# Patient Record
Sex: FEMALE | Race: BLACK OR AFRICAN AMERICAN | NOT HISPANIC OR LATINO
[De-identification: names, ages, dates, MRNs, and addresses within clinical notes are randomized per-mention and may not be internally consistent; named-entity substitution may affect disease eponyms.]

---

## 2018-10-02 ENCOUNTER — TRANSCRIPTION ENCOUNTER (OUTPATIENT)
Age: 68
End: 2018-10-02

## 2019-08-21 ENCOUNTER — NON-APPOINTMENT (OUTPATIENT)
Age: 69
End: 2019-08-21

## 2019-08-21 ENCOUNTER — APPOINTMENT (OUTPATIENT)
Dept: CARDIOLOGY | Facility: CLINIC | Age: 69
End: 2019-08-21
Payer: MEDICARE

## 2019-08-21 VITALS
BODY MASS INDEX: 37.56 KG/M2 | DIASTOLIC BLOOD PRESSURE: 72 MMHG | SYSTOLIC BLOOD PRESSURE: 128 MMHG | WEIGHT: 212 LBS | HEART RATE: 57 BPM | RESPIRATION RATE: 14 BRPM | HEIGHT: 63 IN

## 2019-08-21 DIAGNOSIS — Z00.00 ENCOUNTER FOR GENERAL ADULT MEDICAL EXAMINATION W/OUT ABNORMAL FINDINGS: ICD-10-CM

## 2019-08-21 PROCEDURE — 99214 OFFICE O/P EST MOD 30 MIN: CPT

## 2019-08-21 PROCEDURE — 93000 ELECTROCARDIOGRAM COMPLETE: CPT

## 2019-08-21 NOTE — ASSESSMENT
[FreeTextEntry1] : EKG demonstrates normal sinus rhythm with a rate of 57, slight left axis deviation, poor R-wave progression V1 to V3 with borderline nonspecific T-wave flattening in the lateral leads;\par \par ;in summary the patient is a 69-year-old woman with a history of mild to moderate valvular insufficiency and slight heart murmur with abnormal EKG who was been stable and asymptomatic from the cardiac standpoint\par \par Plan:\par \par Patient encouraged ; modest walking exercise regimen this summer\par \par ; Continue to maintain healthy nutritional weight loss plan\par \par Recommend echo by next visit-- within 5-6 months or p.r.n.;\par \par continued timely checkups and laboratory blood tests w/ PCP care;

## 2019-08-21 NOTE — PHYSICAL EXAM
[Normal Conjunctiva] : the conjunctiva exhibited no abnormalities [Eyelids - No Xanthelasma] : the eyelids demonstrated no xanthelasmas [Normal Oral Mucosa] : normal oral mucosa [No Oral Cyanosis] : no oral cyanosis [No Oral Pallor] : no oral pallor [Normal Jugular Venous A Waves Present] : normal jugular venous A waves present [Normal Jugular Venous V Waves Present] : normal jugular venous V waves present [No Jugular Venous Landis A Waves] : no jugular venous landis A waves [Respiration, Rhythm And Depth] : normal respiratory rhythm and effort [Exaggerated Use Of Accessory Muscles For Inspiration] : no accessory muscle use [Auscultation Breath Sounds / Voice Sounds] : lungs were clear to auscultation bilaterally [Abdomen Soft] : soft [Abdomen Tenderness] : non-tender [Abdomen Mass (___ Cm)] : no abdominal mass palpated [Abnormal Walk] : normal gait [Gait - Sufficient For Exercise Testing] : the gait was sufficient for exercise testing [Nail Clubbing] : no clubbing of the fingernails [Cyanosis, Localized] : no localized cyanosis [Petechial Hemorrhages (___cm)] : no petechial hemorrhages [Fingers Osler's Nodes] : Osler's nodes were not seenon the fingers [FreeTextEntry1] : Surgical scar on her right knee well-healed [Skin Color & Pigmentation] : normal skin color and pigmentation [] : no rash [No Venous Stasis] : no venous stasis [Skin Lesions] : no skin lesions [No Skin Ulcers] : no skin ulcer [No Xanthoma] : no  xanthoma was observed [Oriented To Time, Place, And Person] : oriented to person, place, and time [Affect] : the affect was normal [Mood] : the mood was normal [No Anxiety] : not feeling anxious

## 2019-08-21 NOTE — HISTORY OF PRESENT ILLNESS
[FreeTextEntry1] : She reports a recent checkup with her primary care including laboratory blood tests and states everything seemed "favorable";\par \par \par Past medical history also includes total right knee replacement surgery after injury several years ago;\par \par Patient reports she recently returned from a trip to [place], [place] where she was visiting with family;\par

## 2019-08-21 NOTE — REASON FOR VISIT
[Follow-Up - Clinic] : a clinic follow-up of [FreeTextEntry1] : The patient is a 69-year-old white female with a known history for borderline abnormal EKG, mild to moderate valvular insufficiency and prior history of bariatric surgery for obesity who presents back to the office today for general cardiac checkup.\par \par Patient reports she's been feeling well through the summer without any significant symptoms of chest pain, shortness of breath, palpitations dizziness or syncope\par \par ;;

## 2020-01-24 ENCOUNTER — APPOINTMENT (OUTPATIENT)
Dept: CARDIOLOGY | Facility: CLINIC | Age: 70
End: 2020-01-24
Payer: MEDICARE

## 2020-01-24 PROCEDURE — 93306 TTE W/DOPPLER COMPLETE: CPT

## 2020-02-04 ENCOUNTER — APPOINTMENT (OUTPATIENT)
Dept: CARDIOLOGY | Facility: CLINIC | Age: 70
End: 2020-02-04

## 2020-09-21 ENCOUNTER — TRANSCRIPTION ENCOUNTER (OUTPATIENT)
Age: 70
End: 2020-09-21

## 2020-11-29 ENCOUNTER — TRANSCRIPTION ENCOUNTER (OUTPATIENT)
Age: 70
End: 2020-11-29

## 2020-12-13 ENCOUNTER — TRANSCRIPTION ENCOUNTER (OUTPATIENT)
Age: 70
End: 2020-12-13

## 2021-08-26 ENCOUNTER — TRANSCRIPTION ENCOUNTER (OUTPATIENT)
Age: 71
End: 2021-08-26

## 2021-09-06 ENCOUNTER — TRANSCRIPTION ENCOUNTER (OUTPATIENT)
Age: 71
End: 2021-09-06

## 2021-10-29 ENCOUNTER — TRANSCRIPTION ENCOUNTER (OUTPATIENT)
Age: 71
End: 2021-10-29

## 2021-12-29 ENCOUNTER — NON-APPOINTMENT (OUTPATIENT)
Age: 71
End: 2021-12-29

## 2021-12-29 ENCOUNTER — APPOINTMENT (OUTPATIENT)
Dept: CARDIOLOGY | Facility: CLINIC | Age: 71
End: 2021-12-29
Payer: MEDICARE

## 2021-12-29 VITALS
BODY MASS INDEX: 38.98 KG/M2 | RESPIRATION RATE: 14 BRPM | HEART RATE: 51 BPM | WEIGHT: 220 LBS | HEIGHT: 63 IN | DIASTOLIC BLOOD PRESSURE: 69 MMHG | SYSTOLIC BLOOD PRESSURE: 129 MMHG

## 2021-12-29 PROCEDURE — 99214 OFFICE O/P EST MOD 30 MIN: CPT

## 2021-12-29 PROCEDURE — 93000 ELECTROCARDIOGRAM COMPLETE: CPT

## 2021-12-29 RX ORDER — LEVOTHYROXINE SODIUM 0.14 MG/1
137 TABLET ORAL DAILY
Refills: 0 | Status: ACTIVE | COMMUNITY

## 2021-12-29 NOTE — REASON FOR VISIT
[Symptom and Test Evaluation] : symptom and test evaluation [Arrhythmia/ECG Abnorrmalities] : arrhythmia/ECG abnormalities [Structural Heart and Valve Disease] : structural heart and valve disease [Other: ____] : [unfilled] [FreeTextEntry3] : AIDA Howell [FreeTextEntry1] : The patient is a 71-year-old  woman who has a history for mild to moderate valvular insufficiency and borderline abnormal EKG in the past with obesity; she has a prior history of bariatric surgery with significant weight loss;\par \par Patient presents back to the office today for general cardiac check up;\par \par She reports that she's been experiencing lately some fluttering-like feeling or palpitations in the chest especially when she lays down in the evening;\par \par There's been no chest pain, dizziness or syncope; she does admit to occasional shortness of breath when going up the stairs;

## 2021-12-29 NOTE — PHYSICAL EXAM
[Well Developed] : well developed [Well Nourished] : well nourished [No Acute Distress] : no acute distress [Obese] : obese [Normal Conjunctiva] : normal conjunctiva [Normal Venous Pressure] : normal venous pressure [No Carotid Bruit] : no carotid bruit [Normal S1, S2] : normal S1, S2 [No Rub] : no rub [No Gallop] : no gallop [Clear Lung Fields] : clear lung fields [Good Air Entry] : good air entry [No Respiratory Distress] : no respiratory distress  [Soft] : abdomen soft [Non Tender] : non-tender [No Masses/organomegaly] : no masses/organomegaly [Normal Bowel Sounds] : normal bowel sounds [Normal Gait] : normal gait [No Edema] : no edema [No Cyanosis] : no cyanosis [No Clubbing] : no clubbing [No Varicosities] : no varicosities [No Rash] : no rash [No Skin Lesions] : no skin lesions [Moves all extremities] : moves all extremities [No Focal Deficits] : no focal deficits [Normal Speech] : normal speech [Alert and Oriented] : alert and oriented [Normal memory] : normal memory [de-identified] : regular rhythm, grade 1-2/6 systolic murmur;

## 2021-12-29 NOTE — HISTORY OF PRESENT ILLNESS
[FreeTextEntry1] : Patient admits to being under some increased stress recently as her kids had moved back into the house for a time but expects that they will be leaving soon on their own;\par \par \par She drinks some caffeinated coffee daily and eats chocolate as well;\par Occasional glass of wine;

## 2021-12-29 NOTE — ASSESSMENT
[FreeTextEntry1] : EKG demonstrating sinus bradycardia at a rate of 51; slight left patient with borderline LVH pattern and nonspecific T-wave flattening;\par \par in summary, this 71-year-old woman who carries a significant cardiac risk factors including positive family history for heart disease has been experiencing some intermittent palpitations in the chest and some exertional dyspnea and has gained back some additional weight of recent;\par \par \par \par Plan:\par \par patient recommended to wear a 24-hour Holter monitor to rule out any significant dysrhythmia;\par \par Recommend nuclear stress test (pharmacologic protocol) to be scheduled sometime in the near future as well to rule out any significant ischemia;\par \par Counseled patient on proper diet and nutrition and the fact that possibly GERD could be a factor triggering some of her nightly palpitations and should be discussed with primary care and possible GI specialist;\par \par Return to office within 2-3 months or p.r.n.;\par \par

## 2022-03-08 ENCOUNTER — APPOINTMENT (OUTPATIENT)
Dept: CARDIOLOGY | Facility: CLINIC | Age: 72
End: 2022-03-08
Payer: MEDICARE

## 2022-03-08 PROCEDURE — 78452 HT MUSCLE IMAGE SPECT MULT: CPT

## 2022-03-08 PROCEDURE — A9500: CPT

## 2022-03-08 PROCEDURE — 93015 CV STRESS TEST SUPVJ I&R: CPT

## 2022-03-08 RX ORDER — REGADENOSON 0.08 MG/ML
0.4 INJECTION, SOLUTION INTRAVENOUS
Qty: 4 | Refills: 0 | Status: COMPLETED | OUTPATIENT
Start: 2022-03-08

## 2022-03-08 RX ADMIN — REGADENOSON 0 MG/5ML: 0.08 INJECTION, SOLUTION INTRAVENOUS at 00:00

## 2022-03-09 ENCOUNTER — APPOINTMENT (OUTPATIENT)
Dept: CARDIOLOGY | Facility: CLINIC | Age: 72
End: 2022-03-09

## 2022-04-08 ENCOUNTER — APPOINTMENT (OUTPATIENT)
Dept: CARDIOLOGY | Facility: CLINIC | Age: 72
End: 2022-04-08

## 2022-04-20 ENCOUNTER — TRANSCRIPTION ENCOUNTER (OUTPATIENT)
Age: 72
End: 2022-04-20

## 2022-04-28 ENCOUNTER — NON-APPOINTMENT (OUTPATIENT)
Age: 72
End: 2022-04-28

## 2022-04-28 ENCOUNTER — APPOINTMENT (OUTPATIENT)
Dept: CARDIOLOGY | Facility: CLINIC | Age: 72
End: 2022-04-28
Payer: MEDICARE

## 2022-04-28 VITALS
HEART RATE: 48 BPM | HEIGHT: 63 IN | BODY MASS INDEX: 38.09 KG/M2 | WEIGHT: 215 LBS | SYSTOLIC BLOOD PRESSURE: 141 MMHG | RESPIRATION RATE: 14 BRPM | DIASTOLIC BLOOD PRESSURE: 71 MMHG

## 2022-04-28 PROCEDURE — 93000 ELECTROCARDIOGRAM COMPLETE: CPT

## 2022-04-28 PROCEDURE — 99214 OFFICE O/P EST MOD 30 MIN: CPT

## 2022-04-28 NOTE — REASON FOR VISIT
[Arrhythmia/ECG Abnorrmalities] : arrhythmia/ECG abnormalities [Structural Heart and Valve Disease] : structural heart and valve disease [FreeTextEntry3] : AIDA Howell [FreeTextEntry1] : The patient is a 71-year-old  woman with a history for mild to moderate insufficiency and heart murmur with prior history of palpitations in bradycardia status post bariatric surgery with significant weight loss in the past;\par \par She returns to the office today for general cardiac checkup, and to discuss results of recent cardiac testing;\par \par Overall, patient states her stress level is still elevated to 2 family and grandkids living at home, but overall she has had less symptoms were palpitations. She denies chest pain, shortness of breath, dizziness or syncope;

## 2022-04-28 NOTE — PHYSICAL EXAM
[Well Developed] : well developed [Well Nourished] : well nourished [No Acute Distress] : no acute distress [Obese] : obese [Normal Conjunctiva] : normal conjunctiva [Normal Venous Pressure] : normal venous pressure [No Carotid Bruit] : no carotid bruit [Normal S1, S2] : normal S1, S2 [No Rub] : no rub [No Gallop] : no gallop [Clear Lung Fields] : clear lung fields [Good Air Entry] : good air entry [No Respiratory Distress] : no respiratory distress  [Soft] : abdomen soft [No Masses/organomegaly] : no masses/organomegaly [Normal Bowel Sounds] : normal bowel sounds [Normal Gait] : normal gait [No Edema] : no edema [No Cyanosis] : no cyanosis [No Clubbing] : no clubbing [No Varicosities] : no varicosities [No Rash] : no rash [No Skin Lesions] : no skin lesions [Moves all extremities] : moves all extremities [No Focal Deficits] : no focal deficits [Normal Speech] : normal speech [Alert and Oriented] : alert and oriented [Normal memory] : normal memory [de-identified] : Regular rhythm, grade 1-2/6 systolic murmur; [de-identified] : Overweight, soft, nontender;

## 2022-04-28 NOTE — HISTORY OF PRESENT ILLNESS
[FreeTextEntry1] : She is trying to be more physically active and do some exercise which seems to make her feel better;\par \par Holter monitor test from December 2021 showed normal sinus rhythm/sinus bradycardia. Occasional PACs were noted but otherwise no significant arrhythmia;\par \par Nuclear stress test with pharmacologic protocol from March 2022--demonstrated no symptoms of chest pain. No arrhythmias were seen. EKG remained negative for significant ST abnormalities. Myocardial perfusion images showed normal perfusion without any evidence of ischemia-i.e. negative test;\par \par

## 2022-04-28 NOTE — ASSESSMENT
[FreeTextEntry1] : EKG shows sinus bradycardia at a rate of 48; slight left axis deviation, otherwise no acute changes;\par \par In summary, this 71-year-old black female has a history for borderline hypertension, sinus bradycardia on EKG with history for occasional palpitations in the past and a heart murmur with otherwise stable cardiac pattern;\par Recent normal pharmacologic myocardial perfusion stress test;\par \par \par \par Plan:\par \par Patient reassured;\par \par agree with continuing moderate physical activity and exercise;\par \par No additional cardiac workup indicated at this time;\par \par Okay to followup within 6 months to one year or p.r.n.;\par \par Regular checkups with primary care and encouraged;

## 2022-06-22 RX ORDER — KIT FOR THE PREPARATION OF TECHNETIUM TC99M SESTAMIBI 1 MG/5ML
INJECTION, POWDER, LYOPHILIZED, FOR SOLUTION PARENTERAL
Refills: 0 | Status: COMPLETED | OUTPATIENT
Start: 2022-06-22

## 2022-06-22 RX ADMIN — KIT FOR THE PREPARATION OF TECHNETIUM TC99M SESTAMIBI 0: 1 INJECTION, POWDER, LYOPHILIZED, FOR SOLUTION PARENTERAL at 00:00

## 2022-08-15 ENCOUNTER — APPOINTMENT (OUTPATIENT)
Dept: PAIN MANAGEMENT | Facility: CLINIC | Age: 72
End: 2022-08-15

## 2022-08-15 VITALS — BODY MASS INDEX: 39.51 KG/M2 | WEIGHT: 223 LBS | HEIGHT: 63 IN

## 2022-08-15 PROCEDURE — 99213 OFFICE O/P EST LOW 20 MIN: CPT

## 2022-08-15 PROCEDURE — 99072 ADDL SUPL MATRL&STAF TM PHE: CPT

## 2022-08-15 NOTE — HISTORY OF PRESENT ILLNESS
[Lower back] : lower back [Radiating] : radiating [Sharp] : sharp [Tingling] : tingling [Constant] : constant [Household chores] : household chores [Leisure] : leisure [Sleep] : sleep [Social interactions] : social interactions [Meds] : meds [Heat] : heat [Sitting] : sitting [Standing] : standing [Walking] : walking [Bending forward] : bending forward [Exercising] : exercising [Stairs] : stairs [Lying in bed] : lying in bed [FreeTextEntry1] : 08/15/2022: follow up today.  Would like a refill of gabapentin and diclofenac cream.  Not interested in injections at this time.\par \par 12/1/21- Patient complains of pain returning in both legs down front of thighs and numbness into both feet. Will\par schedule LESI L4-L5.\par \par 9/20/21: follow up today after LESI L4/5 on 9/9/21. Pain better in the legs however still with pain in the bilateral hips. she will get this works. \par \par 5/26/21: follow up today to review MRI. Pain in the lower back with radiation down the legs to the toes. : 1. Diffuse\par degenerative disc disease with multilevel Schmorl's nodes and Modic type 1 endplate change at L2-L3 and L5-S1. This is better visualized than on the prior study.\par 2. T12-L1: Bulge and facet hypertrophy. Broad central and asymmetric to left herniation impressing on the thecal sac with caudal migration posterior to L1. Findings are not significantly changed from the prior study.\par 3. L1-L2: Broad bulge and facet hypertrophy. Broad central herniation impressing on the thecal sac with mild central stenosis. Findings are not significantly changed from the prior study.\par 4. L2-L3: Broad bulge, facet hypertrophy, and ligamentum flavum hypertrophy with inferior foraminal stenosis and\par impingement on the exiting nerve roots. Broad central herniation with mild central stenosis. Findings are not significantly changed from the prior study.5. L3-L4: Broad bulge and facet hypertrophy with inferior foraminal stenosis and impingement on the exiting nerve roots. Central herniation with caudal migration posterior to L4 with mild stenosis. Findings are not significantly changed from the prior study.\par 6. L4-L5: Broad bulge and facet hypertrophy with foraminal stenosis and impingement on the exiting right L4 nerve root. Broad herniation impressing on the thecal sac with extruded 5 mm asymmetric to left component demonstrating caudal migration posterior to L5. Mild central stenosis with the extruded component resulting in additional mass effect on the left-sided nerve roots within the thecal sac. The extruded component of the herniation is new from the prior study.\par 7. L5-S1: Broad bulge, spondylotic ridge, and facet hypertrophy with foraminal stenosis and impingement on the exiting nerve roots. Central herniation and spondylotic ridge. Findings are not significantly changed from the prior study.\par \par she is using Diclofenac cream with improvement of her pain. LESI would help her pain, however she would like to hold off.\par \par 5/12/21- Patient complains of pain in low back and radiates down the back of both legs and second toe on each foot numb. Last JOSEFA did not help that nmuch. Will repeat MRI.\par \par 12/29/20- patient had 60% relief from TFESI. Will hold off on another injection for now.\par \par 11/11/20: follow up today. Pain in the back and posterior right buttock and down the posterior leg to the foot. -n/t,\par just pain. she typically requires 2 JOSEFA's. Last provided >60% relief. I would recommend repeat LESI for improved pain relief, improved ROM and strength. \par \par 9/8/20: Pt had >60 % relief from Lesi from pain and ADLS. Pt c/o pain when she cleans her house. Requesting.\par Requesting Percocet for pain relief when needed.  [] : no

## 2022-09-02 ENCOUNTER — NON-APPOINTMENT (OUTPATIENT)
Age: 72
End: 2022-09-02

## 2022-10-05 ENCOUNTER — NON-APPOINTMENT (OUTPATIENT)
Age: 72
End: 2022-10-05

## 2022-11-14 ENCOUNTER — APPOINTMENT (OUTPATIENT)
Dept: PAIN MANAGEMENT | Facility: CLINIC | Age: 72
End: 2022-11-14

## 2022-11-14 VITALS — BODY MASS INDEX: 39.51 KG/M2 | HEIGHT: 63 IN | WEIGHT: 223 LBS

## 2022-11-14 PROCEDURE — 99213 OFFICE O/P EST LOW 20 MIN: CPT

## 2022-11-14 PROCEDURE — 99072 ADDL SUPL MATRL&STAF TM PHE: CPT

## 2022-11-14 NOTE — HISTORY OF PRESENT ILLNESS
[Lower back] : lower back [Radiating] : radiating [Sharp] : sharp [Tingling] : tingling [Constant] : constant [Household chores] : household chores [Leisure] : leisure [Sleep] : sleep [Social interactions] : social interactions [Meds] : meds [Heat] : heat [Sitting] : sitting [Standing] : standing [Walking] : walking [Bending forward] : bending forward [Exercising] : exercising [Stairs] : stairs [Lying in bed] : lying in bed [Work related] : work related [8] : 8 [6] : 6 [Dull/Aching] : dull/aching [Throbbing] : throbbing [Retired] : Work status: retired [] : no [FreeTextEntry3] : 10/26/99 [FreeTextEntry6] : Numbness  [FreeTextEntry7] : left leg down to the toe [FreeTextEntry1] : 11/14/22- The patient would benefit from physical therapy. Short and Long Term goals would be improvement of pain level, improvement of range of motion, improvement of strength and overall improvement of quality of life.\par Will renew diclofenac gel\par \par 08/15/2022: follow up today.  Would like a refill of gabapentin and diclofenac cream.  Not interested in injections at this time.\par \par 12/1/21- Patient complains of pain returning in both legs down front of thighs and numbness into both feet. Will\par schedule LESI L4-L5.\par \par 9/20/21: follow up today after LESI L4/5 on 9/9/21. Pain better in the legs however still with pain in the bilateral hips. she will get this works. \par \par 5/26/21: follow up today to review MRI. Pain in the lower back with radiation down the legs to the toes. : 1. Diffuse\par degenerative disc disease with multilevel Schmorl's nodes and Modic type 1 endplate change at L2-L3 and L5-S1. This is better visualized than on the prior study.\par 2. T12-L1: Bulge and facet hypertrophy. Broad central and asymmetric to left herniation impressing on the thecal sac with caudal migration posterior to L1. Findings are not significantly changed from the prior study.\par 3. L1-L2: Broad bulge and facet hypertrophy. Broad central herniation impressing on the thecal sac with mild central stenosis. Findings are not significantly changed from the prior study.\par 4. L2-L3: Broad bulge, facet hypertrophy, and ligamentum flavum hypertrophy with inferior foraminal stenosis and\par impingement on the exiting nerve roots. Broad central herniation with mild central stenosis. Findings are not significantly changed from the prior study.5. L3-L4: Broad bulge and facet hypertrophy with inferior foraminal stenosis and impingement on the exiting nerve roots. Central herniation with caudal migration posterior to L4 with mild stenosis. Findings are not significantly changed from the prior study.\par 6. L4-L5: Broad bulge and facet hypertrophy with foraminal stenosis and impingement on the exiting right L4 nerve root. Broad herniation impressing on the thecal sac with extruded 5 mm asymmetric to left component demonstrating caudal migration posterior to L5. Mild central stenosis with the extruded component resulting in additional mass effect on the left-sided nerve roots within the thecal sac. The extruded component of the herniation is new from the prior study.\par 7. L5-S1: Broad bulge, spondylotic ridge, and facet hypertrophy with foraminal stenosis and impingement on the exiting nerve roots. Central herniation and spondylotic ridge. Findings are not significantly changed from the prior study.\par \par she is using Diclofenac cream with improvement of her pain. LESI would help her pain, however she would like to hold off.\par \par 5/12/21- Patient complains of pain in low back and radiates down the back of both legs and second toe on each foot numb. Last JOSEFA did not help that nmuch. Will repeat MRI.\par \par 12/29/20- patient had 60% relief from TFESI. Will hold off on another injection for now.\par \par 11/11/20: follow up today. Pain in the back and posterior right buttock and down the posterior leg to the foot. -n/t,\par just pain. she typically requires 2 JOSEFA's. Last provided >60% relief. I would recommend repeat LESI for improved pain relief, improved ROM and strength. \par \par 9/8/20: Pt had >60 % relief from Lesi from pain and ADLS. Pt c/o pain when she cleans her house. Requesting.\par Requesting Percocet for pain relief when needed.

## 2023-01-06 ENCOUNTER — EMERGENCY (EMERGENCY)
Facility: HOSPITAL | Age: 73
LOS: 1 days | Discharge: HOME | End: 2023-01-06
Attending: EMERGENCY MEDICINE
Payer: MEDICARE

## 2023-01-06 VITALS
HEIGHT: 63 IN | HEART RATE: 59 BPM | OXYGEN SATURATION: 100 % | DIASTOLIC BLOOD PRESSURE: 72 MMHG | TEMPERATURE: 98 F | RESPIRATION RATE: 18 BRPM | WEIGHT: 210.1 LBS | SYSTOLIC BLOOD PRESSURE: 179 MMHG

## 2023-01-06 DIAGNOSIS — Z96.659 PRESENCE OF UNSPECIFIED ARTIFICIAL KNEE JOINT: Chronic | ICD-10-CM

## 2023-01-06 PROCEDURE — 73630 X-RAY EXAM OF FOOT: CPT | Mod: 26,RT

## 2023-01-06 PROCEDURE — 99284 EMERGENCY DEPT VISIT MOD MDM: CPT | Mod: FS

## 2023-01-06 PROCEDURE — 73630 X-RAY EXAM OF FOOT: CPT

## 2023-01-06 PROCEDURE — 99283 EMERGENCY DEPT VISIT LOW MDM: CPT

## 2023-01-06 RX ORDER — ACETAMINOPHEN 500 MG
975 TABLET ORAL ONCE
Refills: 0 | Status: COMPLETED | OUTPATIENT
Start: 2023-01-06 | End: 2023-01-06

## 2023-01-06 RX ADMIN — Medication 975 MILLIGRAM(S): at 12:17

## 2023-01-06 NOTE — ED PROVIDER NOTE - NSFOLLOWUPINSTRUCTIONS_ED_ALL_ED_FT
Please take ibuprofen 600mg every 6 hours as needed for pain  Please take tylenol 650mg every 6hours as needed for pain  Apply ice to area 20 minutes 4 times daily  Follow up with primary care doctor in 2-3 days  Follow up with podiatry as needed  Return to ER for new or worsening symptoms    Contusion    A contusion is a deep bruise. Contusions are the result of a blunt injury to tissues and muscle fibers under the skin. The skin overlying the contusion may turn blue, purple, or yellow. Symptoms also include pain and swelling in the injured area.    SEEK IMMEDIATE MEDICAL CARE IF YOU HAVE ANY OF THE FOLLOWING SYMPTOMS: severe pain, numbness, tingling, pain, weakness, or skin color/temperature change in any part of your body distal to the injury.

## 2023-01-06 NOTE — ED PROVIDER NOTE - CLINICAL SUMMARY MEDICAL DECISION MAKING FREE TEXT BOX
73 y/o F with right big toe pain after dropping can of soup on area. Xray- 73 y/o F with right big toe pain after dropping can of soup on area. Xray- no acute fx. pt notified of calcifications seen. likely contusion -advised RICE analgesia PRN and f/u PCP. pt also aware of elevated BP here will f/u PCP

## 2023-01-06 NOTE — ED ADULT NURSE NOTE - NS ED NOTE ABUSE SUSPICION NEGLECT YN
No
PAST MEDICAL HISTORY:  No pertinent past medical history      (normal spontaneous vaginal delivery) 2018    Other iron deficiency anemia

## 2023-01-06 NOTE — ED PROVIDER NOTE - PHYSICAL EXAMINATION
Gen: No acute distress, non toxic  HEENT: Mucous membranes moist, pink conjunctivae, EOMI  Neuro: A&O x 3, moving all 4 extremities  MSK: +tenderness ecchymosis right big toe distal phalanx full ROM sensation intact 2+cap refill  Skin: No rashes. intact and perfused.

## 2023-01-06 NOTE — ED PROVIDER NOTE - WR ORDER STATUS 1
Care Due:                  Date            Visit Type   Department     Provider  --------------------------------------------------------------------------------                                VIRTUAL      [facility]  Last Visit: 12-      AUDIO ONLY   CARE           Zoey Nelson  Next Visit: None Scheduled  None         None Found                                                            Last  Test          Frequency    Reason                     Performed    Due Date  --------------------------------------------------------------------------------    CMP.........  12 months..  potassium................  05- 05-    Powered by [employer] by [employer]. Reference number: [id number].   4/06/2022 11:35:40 AM CDT   Performed

## 2023-01-06 NOTE — ED ADULT NURSE NOTE - NSFALLRSKINDICATORS_ED_ALL_ED
Randy Bence is calling to request a refill on the following medication(s):    Last Visit Date (If Applicable):  4/09/4001    Next Visit Date:    8/25/2020  Last filled 06/01/2020  Medication Request:  Requested Prescriptions     Pending Prescriptions Disp Refills    levothyroxine (SYNTHROID) 100 MCG tablet [Pharmacy Med Name: LEVOTHYROXINE 100 MCG TABLET] 30 tablet 0     Sig: TAKE ONE TABLET BY MOUTH DAILY (NEED RECHECK AND REPEAT LAB BEFORE FURTHER REFILLS) no

## 2023-01-06 NOTE — ED PROVIDER NOTE - CARE PROVIDER_API CALL
Rasheeda Boston (DPM)  Podiatric Medicine and Surgery  [address] [place] [address]  Phone: [phone]  Fax: [phone]  Follow Up Time:

## 2023-01-06 NOTE — ED PROVIDER NOTE - OBJECTIVE STATEMENT
73 y/o F presents to ER c/o right big toe pain since last night after she dropped a heavy can of soup on area. denies numbness tingling weakness. denies blood thinners.

## 2023-01-06 NOTE — ED PROVIDER NOTE - NS ED ATTENDING STATEMENT MOD
This was a shared visit with the DOUG. I reviewed and verified the documentation and independently performed the documented:

## 2023-01-06 NOTE — ED ADULT NURSE NOTE - OBJECTIVE STATEMENT
pt reports pain to front of right foot s/p dropping soup can on foot last night. no edema or deformity noted.

## 2023-01-06 NOTE — ED PROVIDER NOTE - ATTENDING APP SHARED VISIT CONTRIBUTION OF CARE
72yoF p/w right great toe pain s/p soup can falling onto foot.  denies numbness/tingling.  EXAM:  ttp @proximal phalynx of great toe, from @ toe  A/P:  72yoF p/w foot contusion  -xray, pain control, f/up with podiatry

## 2023-01-06 NOTE — ED PROVIDER NOTE - PATIENT PORTAL LINK FT
You can access the FollowMyHealth Patient Portal offered by [facility] by registering at the following website: [email or web address]. By joining [employer]’s FollowMyHealth portal, you will also be able to view your health information using other applications (apps) compatible with our system.

## 2023-01-29 ENCOUNTER — NON-APPOINTMENT (OUTPATIENT)
Age: 73
End: 2023-01-29

## 2023-02-12 ENCOUNTER — NON-APPOINTMENT (OUTPATIENT)
Age: 73
End: 2023-02-12

## 2023-02-27 ENCOUNTER — APPOINTMENT (OUTPATIENT)
Dept: PAIN MANAGEMENT | Facility: CLINIC | Age: 73
End: 2023-02-27
Payer: OTHER MISCELLANEOUS

## 2023-02-27 VITALS — BODY MASS INDEX: 39.51 KG/M2 | HEIGHT: 63 IN | WEIGHT: 223 LBS

## 2023-02-27 DIAGNOSIS — Z78.9 OTHER SPECIFIED HEALTH STATUS: ICD-10-CM

## 2023-02-27 PROCEDURE — 99072 ADDL SUPL MATRL&STAF TM PHE: CPT

## 2023-02-27 PROCEDURE — 99214 OFFICE O/P EST MOD 30 MIN: CPT

## 2023-02-27 NOTE — ASSESSMENT
[FreeTextEntry1] : After discussing various treatment options with the patient including but not limited to oral medications, physical therapy, exercise, modalities as well as interventional spinal injections, we have decided with the following plan:\par \par 1) Patient is stable on current regimen of medication. Denies any side of effects. Risks and benefits discussed. Functional improvement noted. At least >30% improvement of pain. Will renew patient medication.\par \par The patient is stable on current medication with analgesia and without notable side effects or any obvious aberrant behaviors exhibited.   There is clinically meaningful improvement in pain and function that outweighs risks to patient safety.  I have discussed risks and realistic benefits of therapy and patient and clinician responsibilities for managing therapy.   Will renew medication today.\par \par 2) The patient would benefit from continuation of physical therapy. Short and Long Term goals would be improvement of pain level, improvement of range of motion, improvement of strength and overall improvement of quality of life.\par \par Patient instructed to continue both active and passive therapy, at home as an extension of the treatment process in order to maintain improvement. \par \par Goals: improve cardiovascular fitness, reduce edema, improve muscle strength, improve connective tissue strength and integrity, increase bone density, promote circulation to enhance soft tissue healing, improvement of muscle recruitment, increased ROM and promotion of normal movement.

## 2023-02-27 NOTE — HISTORY OF PRESENT ILLNESS
[Lower back] : lower back [Work related] : work related [8] : 8 [6] : 6 [Dull/Aching] : dull/aching [Radiating] : radiating [Sharp] : sharp [Throbbing] : throbbing [Tingling] : tingling [Constant] : constant [Household chores] : household chores [Leisure] : leisure [Sleep] : sleep [Social interactions] : social interactions [Meds] : meds [Heat] : heat [Sitting] : sitting [Standing] : standing [Walking] : walking [Bending forward] : bending forward [Exercising] : exercising [Stairs] : stairs [Lying in bed] : lying in bed [Retired] : Work status: retired [Rest] : rest [FreeTextEntry1] : 02/27/23: follow up today. She is using the diclofenac get with relief. They did not authorize PT, however pt is allocated 10 sessions per year per [facility] for maintenance therapy. She was denied this last year, despite her continued pain. \par \par No longer taking gabapentin. \par \par 11/14/22- The patient would benefit from physical therapy. Short and Long Term goals would be improvement of pain level, improvement of range of motion, improvement of strength and overall improvement of quality of life.\par Will renew diclofenac gel\par \par 08/15/2022: follow up today.  Would like a refill of gabapentin and diclofenac cream.  Not interested in injections at this time.\par \par 12/1/21- Patient complains of pain returning in both legs down front of thighs and numbness into both feet. Will\par schedule LESI L4-L5.\par \par 9/20/21: follow up today after LESI L4/5 on 9/9/21. Pain better in the legs however still with pain in the bilateral hips. she will get this works. \par \par 5/26/21: follow up today to review MRI. Pain in the lower back with radiation down the legs to the toes. : 1. Diffuse\par degenerative disc disease with multilevel Schmorl's nodes and Modic type 1 endplate change at L2-L3 and L5-S1. This is better visualized than on the prior study.\par 2. T12-L1: Bulge and facet hypertrophy. Broad central and asymmetric to left herniation impressing on the thecal sac with caudal migration posterior to L1. Findings are not significantly changed from the prior study.\par 3. L1-L2: Broad bulge and facet hypertrophy. Broad central herniation impressing on the thecal sac with mild central stenosis. Findings are not significantly changed from the prior study.\par 4. L2-L3: Broad bulge, facet hypertrophy, and ligamentum flavum hypertrophy with inferior foraminal stenosis and\par impingement on the exiting nerve roots. Broad central herniation with mild central stenosis. Findings are not significantly changed from the prior study.5. L3-L4: Broad bulge and facet hypertrophy with inferior foraminal stenosis and impingement on the exiting nerve roots. Central herniation with caudal migration posterior to L4 with mild stenosis. Findings are not significantly changed from the prior study.\par 6. L4-L5: Broad bulge and facet hypertrophy with foraminal stenosis and impingement on the exiting right L4 nerve root. Broad herniation impressing on the thecal sac with extruded 5 mm asymmetric to left component demonstrating caudal migration posterior to L5. Mild central stenosis with the extruded component resulting in additional mass effect on the left-sided nerve roots within the thecal sac. The extruded component of the herniation is new from the prior study.\par 7. L5-S1: Broad bulge, spondylotic ridge, and facet hypertrophy with foraminal stenosis and impingement on the exiting nerve roots. Central herniation and spondylotic ridge. Findings are not significantly changed from the prior study.\par \par she is using Diclofenac cream with improvement of her pain. LESI would help her pain, however she would like to hold off.\par \par 5/12/21- Patient complains of pain in low back and radiates down the back of both legs and second toe on each foot numb. Last JOSEFA did not help that nmuch. Will repeat MRI.\par \par 12/29/20- patient had 60% relief from TFESI. Will hold off on another injection for now.\par \par 11/11/20: follow up today. Pain in the back and posterior right buttock and down the posterior leg to the foot. -n/t,\par just pain. she typically requires 2 JOSEFA's. Last provided >60% relief. I would recommend repeat LESI for improved pain relief, improved ROM and strength. \par \par 9/8/20: Pt had >60 % relief from Lesi from pain and ADLS. Pt c/o pain when she cleans her house. Requesting.\par Requesting Percocet for pain relief when needed.  [] : no [FreeTextEntry3] : 10/26/99 [FreeTextEntry6] : Numbness  [FreeTextEntry7] : left leg down to the toes [FreeTextEntry9] : ointment

## 2023-02-27 NOTE — PHYSICAL EXAM
[Flexion] : flexion [Extension] : extension [] : positive Rodriguez maneuver facet loading [TWNoteComboBox7] : forward flexion 75 degrees [de-identified] : extension 10 degrees

## 2023-06-28 ENCOUNTER — NON-APPOINTMENT (OUTPATIENT)
Age: 73
End: 2023-06-28

## 2023-07-27 ENCOUNTER — APPOINTMENT (OUTPATIENT)
Dept: ORTHOPEDIC SURGERY | Facility: CLINIC | Age: 73
End: 2023-07-27
Payer: MEDICARE

## 2023-07-27 VITALS — BODY MASS INDEX: 39.51 KG/M2 | HEIGHT: 63 IN | WEIGHT: 223 LBS

## 2023-07-27 PROCEDURE — 99203 OFFICE O/P NEW LOW 30 MIN: CPT | Mod: 25

## 2023-07-27 PROCEDURE — 73610 X-RAY EXAM OF ANKLE: CPT | Mod: LT

## 2023-07-27 PROCEDURE — L4350: CPT | Mod: KX,LT

## 2023-07-27 RX ORDER — MELOXICAM 15 MG/1
15 TABLET ORAL
Qty: 14 | Refills: 0 | Status: ACTIVE | COMMUNITY
Start: 2023-07-27 | End: 1900-01-01

## 2023-07-27 NOTE — PHYSICAL EXAM
[Left] : left foot and ankle [Mild] : mild swelling of medial ankle [NL (40)] : plantar flexion 40 degrees [NL 30)] : inversion 30 degrees [NL (20)] : eversion 20 degrees [5___] : eversion 5[unfilled]/5 [2+] : dorsalis pedis pulse: 2+ [] : patient ambulates without assistive device [TWNoteComboBox7] : dorsiflexion 15 degrees

## 2023-07-27 NOTE — ASSESSMENT
[FreeTextEntry1] : wbat\par airsport\par PT\par ice/eleavte\par mobic for short course\par f/up after PT if not resolved\par The patient's current medication management of their orthopedic diagnosis was reviewed today:\par \par (1) We discussed a comprehensive treatment plan that included possible pharmaceutical management involving the use of prescription strength medications including but not limited to options such as oral Naprosyn 500mg BID, once daily Meloxicam 15 mg, or 500-650 mg Tylenol versus over the counter oral medications and topical prescription NSAID Pennsaid vs over the counter Voltaren gel.\par (2) There is a moderate risk of morbidity with further treatment, especially from use of prescription strength medications and possible side effects of these medications which include upset stomach with oral medications, skin reactions to topical medications and cardiac/renal issues with long term use.\par (3) I recommended that the patient follow-up with their medical physician to discuss any significant specific potential issues with long term medication use such as interactions with current medications or with exacerbation of underlying medical comorbidities.\par (4) The benefits and risks associated with use of injectable, oral or topical, prescription and over the counter anti-inflammatory medications were discussed with the patient. The patient voiced understanding of the risks including but not limited to bleeding, stroke, kidney dysfunction, heart disease, and were referred to the black box warning label for further information.\par \par

## 2023-07-27 NOTE — HISTORY OF PRESENT ILLNESS
[10] : 10 [3] : 3 [Dull/Aching] : dull/aching [Sharp] : sharp [Throbbing] : throbbing [Standing] : standing [Walking] : walking [Stairs] : stairs [de-identified] : 07/27/2023:  1 month medial foot pain. no injury. no tx to date. no prior foot probs. denies dm/tob.  [] : no [FreeTextEntry1] : left ankle

## 2023-08-22 ENCOUNTER — APPOINTMENT (OUTPATIENT)
Dept: PAIN MANAGEMENT | Facility: CLINIC | Age: 73
End: 2023-08-22
Payer: OTHER MISCELLANEOUS

## 2023-08-22 VITALS — WEIGHT: 200 LBS | HEIGHT: 63 IN | BODY MASS INDEX: 35.44 KG/M2

## 2023-08-22 DIAGNOSIS — M54.50 LOW BACK PAIN, UNSPECIFIED: ICD-10-CM

## 2023-08-22 PROCEDURE — 99213 OFFICE O/P EST LOW 20 MIN: CPT

## 2023-08-22 NOTE — PHYSICAL EXAM
[Flexion] : flexion [Extension] : extension [] : no ecchymosis [TWNoteComboBox7] : forward flexion 75 degrees [de-identified] : extension 10 degrees

## 2023-08-22 NOTE — HISTORY OF PRESENT ILLNESS
[Lower back] : lower back [Work related] : work related [8] : 8 [6] : 6 [Dull/Aching] : dull/aching [Radiating] : radiating [Sharp] : sharp [Throbbing] : throbbing [Tingling] : tingling [Constant] : constant [Household chores] : household chores [Leisure] : leisure [Sleep] : sleep [Social interactions] : social interactions [Rest] : rest [Meds] : meds [Heat] : heat [Sitting] : sitting [Standing] : standing [Walking] : walking [Bending forward] : bending forward [Exercising] : exercising [Stairs] : stairs [Lying in bed] : lying in bed [Retired] : Work status: retired [FreeTextEntry1] : 08/21/2023: follow up today.- pt states she is having pain going into the right hip and buttocks into the legs.  Has not had PT in a very long time.  When she had PT years ago she had increased ROM and increased bending of more than 10 degrees.  Was able to walk longer > 10 minutes without pain.  Could do laundry and wash dishes with less pain.  Able to twist side to side.  Right now she can only walk 5 minutes without pain.  She cant stand still or sitting for >10 minutes without pain.  PT would offer an increased quality of life and decrease painj and muscle tightness and weakness.    02/27/23: follow up today. She is using the diclofenac get with relief. They did not authorize PT, however pt is allocated 10 sessions per year per [facility] for maintenance therapy. She was denied this last year, despite her continued pain.   No longer taking gabapentin.   11/14/22- The patient would benefit from physical therapy. Short and Long Term goals would be improvement of pain level, improvement of range of motion, improvement of strength and overall improvement of quality of life. Will renew diclofenac gel  08/15/2022: follow up today.  Would like a refill of gabapentin and diclofenac cream.  Not interested in injections at this time.  12/1/21- Patient complains of pain returning in both legs down front of thighs and numbness into both feet. Will schedule LESI L4-L5.  9/20/21: follow up today after LESI L4/5 on 9/9/21. Pain better in the legs however still with pain in the bilateral hips. she will get this works.   5/26/21: follow up today to review MRI. Pain in the lower back with radiation down the legs to the toes. : 1. Diffuse degenerative disc disease with multilevel Schmorl's nodes and Modic type 1 endplate change at L2-L3 and L5-S1. This is better visualized than on the prior study. 2. T12-L1: Bulge and facet hypertrophy. Broad central and asymmetric to left herniation impressing on the thecal sac with caudal migration posterior to L1. Findings are not significantly changed from the prior study. 3. L1-L2: Broad bulge and facet hypertrophy. Broad central herniation impressing on the thecal sac with mild central stenosis. Findings are not significantly changed from the prior study. 4. L2-L3: Broad bulge, facet hypertrophy, and ligamentum flavum hypertrophy with inferior foraminal stenosis and impingement on the exiting nerve roots. Broad central herniation with mild central stenosis. Findings are not significantly changed from the prior study.5. L3-L4: Broad bulge and facet hypertrophy with inferior foraminal stenosis and impingement on the exiting nerve roots. Central herniation with caudal migration posterior to L4 with mild stenosis. Findings are not significantly changed from the prior study. 6. L4-L5: Broad bulge and facet hypertrophy with foraminal stenosis and impingement on the exiting right L4 nerve root. Broad herniation impressing on the thecal sac with extruded 5 mm asymmetric to left component demonstrating caudal migration posterior to L5. Mild central stenosis with the extruded component resulting in additional mass effect on the left-sided nerve roots within the thecal sac. The extruded component of the herniation is new from the prior study. 7. L5-S1: Broad bulge, spondylotic ridge, and facet hypertrophy with foraminal stenosis and impingement on the exiting nerve roots. Central herniation and spondylotic ridge. Findings are not significantly changed from the prior study.  she is using Diclofenac cream with improvement of her pain. LESI would help her pain, however she would like to hold off.  5/12/21- Patient complains of pain in low back and radiates down the back of both legs and second toe on each foot numb. Last JOSEFA did not help that nmuch. Will repeat MRI.  12/29/20- patient had 60% relief from TFESI. Will hold off on another injection for now.  11/11/20: follow up today. Pain in the back and posterior right buttock and down the posterior leg to the foot. -n/t, just pain. she typically requires 2 JOSEFA's. Last provided >60% relief. I would recommend repeat LESI for improved pain relief, improved ROM and strength.   9/8/20: Pt had >60 % relief from Lesi from pain and ADLS. Pt c/o pain when she cleans her house. Requesting. Requesting Percocet for pain relief when needed.  [] : no [FreeTextEntry3] : 10/26/99 [FreeTextEntry6] : Numbness  [FreeTextEntry7] : left leg down to the toes [FreeTextEntry9] : ointment

## 2023-09-01 ENCOUNTER — NON-APPOINTMENT (OUTPATIENT)
Age: 73
End: 2023-09-01

## 2023-11-02 ENCOUNTER — APPOINTMENT (OUTPATIENT)
Dept: CARDIOLOGY | Facility: CLINIC | Age: 73
End: 2023-11-02

## 2023-11-08 ENCOUNTER — NON-APPOINTMENT (OUTPATIENT)
Age: 73
End: 2023-11-08

## 2023-11-08 ENCOUNTER — APPOINTMENT (OUTPATIENT)
Dept: CARDIOLOGY | Facility: CLINIC | Age: 73
End: 2023-11-08
Payer: MEDICARE

## 2023-11-08 VITALS
RESPIRATION RATE: 14 BRPM | HEIGHT: 63 IN | HEART RATE: 49 BPM | DIASTOLIC BLOOD PRESSURE: 74 MMHG | BODY MASS INDEX: 39.51 KG/M2 | WEIGHT: 223 LBS | SYSTOLIC BLOOD PRESSURE: 140 MMHG

## 2023-11-08 DIAGNOSIS — I34.0 NONRHEUMATIC MITRAL (VALVE) INSUFFICIENCY: ICD-10-CM

## 2023-11-08 PROCEDURE — 93000 ELECTROCARDIOGRAM COMPLETE: CPT

## 2023-11-08 PROCEDURE — 99214 OFFICE O/P EST MOD 30 MIN: CPT

## 2023-11-08 RX ORDER — GABAPENTIN 300 MG/1
300 CAPSULE ORAL 3 TIMES DAILY
Qty: 270 | Refills: 2 | Status: DISCONTINUED | COMMUNITY
Start: 2022-08-15 | End: 2023-11-08

## 2023-11-20 ENCOUNTER — APPOINTMENT (OUTPATIENT)
Dept: CARDIOLOGY | Facility: CLINIC | Age: 73
End: 2023-11-20
Payer: MEDICARE

## 2023-11-20 PROCEDURE — 93306 TTE W/DOPPLER COMPLETE: CPT

## 2023-12-05 ENCOUNTER — APPOINTMENT (OUTPATIENT)
Dept: CARDIOLOGY | Facility: CLINIC | Age: 73
End: 2023-12-05
Payer: MEDICARE

## 2023-12-05 VITALS
RESPIRATION RATE: 16 BRPM | HEART RATE: 47 BPM | WEIGHT: 226 LBS | BODY MASS INDEX: 40.04 KG/M2 | SYSTOLIC BLOOD PRESSURE: 130 MMHG | HEIGHT: 63 IN | DIASTOLIC BLOOD PRESSURE: 70 MMHG

## 2023-12-05 DIAGNOSIS — R06.02 SHORTNESS OF BREATH: ICD-10-CM

## 2023-12-05 DIAGNOSIS — R53.82 CHRONIC FATIGUE, UNSPECIFIED: ICD-10-CM

## 2023-12-05 PROCEDURE — 99214 OFFICE O/P EST MOD 30 MIN: CPT

## 2023-12-06 ENCOUNTER — LABORATORY RESULT (OUTPATIENT)
Age: 73
End: 2023-12-06

## 2023-12-11 ENCOUNTER — APPOINTMENT (OUTPATIENT)
Dept: CARDIOLOGY | Facility: CLINIC | Age: 73
End: 2023-12-11

## 2023-12-27 ENCOUNTER — APPOINTMENT (OUTPATIENT)
Dept: PAIN MANAGEMENT | Facility: CLINIC | Age: 73
End: 2023-12-27
Payer: OTHER MISCELLANEOUS

## 2023-12-27 ENCOUNTER — APPOINTMENT (OUTPATIENT)
Dept: PAIN MANAGEMENT | Facility: CLINIC | Age: 73
End: 2023-12-27

## 2023-12-27 VITALS — HEIGHT: 63 IN | WEIGHT: 225 LBS | BODY MASS INDEX: 39.87 KG/M2

## 2023-12-27 DIAGNOSIS — M54.16 RADICULOPATHY, LUMBAR REGION: ICD-10-CM

## 2023-12-27 PROCEDURE — 99213 OFFICE O/P EST LOW 20 MIN: CPT | Mod: ACP

## 2023-12-27 RX ORDER — DICLOFENAC SODIUM 1% 10 MG/G
1 GEL TOPICAL
Qty: 30 | Refills: 2 | Status: ACTIVE | COMMUNITY
Start: 2022-08-15 | End: 1900-01-01

## 2023-12-27 NOTE — PHYSICAL EXAM
[Flexion] : flexion [Extension] : extension [] : no ecchymosis [TWNoteComboBox7] : forward flexion 75 degrees [de-identified] : extension 10 degrees

## 2023-12-27 NOTE — ASSESSMENT
[FreeTextEntry1] : The patient would benefit from continuation of physical therapy. Short and Long Term goals would be improvement of pain level, improvement of range of motion, improvement of strength and overall improvement of quality of life.    Patient instructed to continue both active and passive therapy, at home as an extension of the treatment process in order to maintain improvement.     Goals: improve cardiovascular fitness, reduce edema, improve muscle strength, improve connective tissue strength and integrity, increase bone density, promote circulation to enhance soft tissue healing, improvement of muscle recruitment, increased ROM and promotion of normal movement.

## 2023-12-27 NOTE — HISTORY OF PRESENT ILLNESS
[Lower back] : lower back [Work related] : work related [8] : 8 [6] : 6 [Dull/Aching] : dull/aching [Radiating] : radiating [Sharp] : sharp [Throbbing] : throbbing [Tingling] : tingling [Constant] : constant [Household chores] : household chores [Leisure] : leisure [Sleep] : sleep [Social interactions] : social interactions [Rest] : rest [Meds] : meds [Heat] : heat [Sitting] : sitting [Standing] : standing [Walking] : walking [Bending forward] : bending forward [Exercising] : exercising [Stairs] : stairs [Lying in bed] : lying in bed [Retired] : Work status: retired [FreeTextEntry1] : 12/27/2023: follow up today for PT.   pt states she is having pain going into the right hip and buttocks into the legs.  Has not had PT in a very long time.  When she had PT years ago she had increased ROM and increased bending of more than 10 degrees.  Was able to walk longer > 10 minutes without pain.  Could do laundry and wash dishes with less pain.  Able to twist side to side.  Right now she can only walk 5 minutes without pain.  She cant stand still or sitting for >10 minutes without pain.  PT would offer an increased quality of life and decrease painj and muscle tightness and weakness  08/21/2023: follow up today.- pt states she is having pain going into the right hip and buttocks into the legs.  Has not had PT in a very long time.  When she had PT years ago she had increased ROM and increased bending of more than 10 degrees.  Was able to walk longer > 10 minutes without pain.  Could do laundry and wash dishes with less pain.  Able to twist side to side.  Right now she can only walk 5 minutes without pain.  She cant stand still or sitting for >10 minutes without pain.  PT would offer an increased quality of life and decrease painj and muscle tightness and weakness.    02/27/23: follow up today. She is using the diclofenac get with relief. They did not authorize PT, however pt is allocated 10 sessions per year per [facility] for maintenance therapy. She was denied this last year, despite her continued pain.   No longer taking gabapentin.   11/14/22- The patient would benefit from physical therapy. Short and Long Term goals would be improvement of pain level, improvement of range of motion, improvement of strength and overall improvement of quality of life. Will renew diclofenac gel  08/15/2022: follow up today.  Would like a refill of gabapentin and diclofenac cream.  Not interested in injections at this time.  12/1/21- Patient complains of pain returning in both legs down front of thighs and numbness into both feet. Will schedule LESI L4-L5.  9/20/21: follow up today after LESI L4/5 on 9/9/21. Pain better in the legs however still with pain in the bilateral hips. she will get this works.   5/26/21: follow up today to review MRI. Pain in the lower back with radiation down the legs to the toes. : 1. Diffuse degenerative disc disease with multilevel Schmorl's nodes and Modic type 1 endplate change at L2-L3 and L5-S1. This is better visualized than on the prior study. 2. T12-L1: Bulge and facet hypertrophy. Broad central and asymmetric to left herniation impressing on the thecal sac with caudal migration posterior to L1. Findings are not significantly changed from the prior study. 3. L1-L2: Broad bulge and facet hypertrophy. Broad central herniation impressing on the thecal sac with mild central stenosis. Findings are not significantly changed from the prior study. 4. L2-L3: Broad bulge, facet hypertrophy, and ligamentum flavum hypertrophy with inferior foraminal stenosis and impingement on the exiting nerve roots. Broad central herniation with mild central stenosis. Findings are not significantly changed from the prior study.5. L3-L4: Broad bulge and facet hypertrophy with inferior foraminal stenosis and impingement on the exiting nerve roots. Central herniation with caudal migration posterior to L4 with mild stenosis. Findings are not significantly changed from the prior study. 6. L4-L5: Broad bulge and facet hypertrophy with foraminal stenosis and impingement on the exiting right L4 nerve root. Broad herniation impressing on the thecal sac with extruded 5 mm asymmetric to left component demonstrating caudal migration posterior to L5. Mild central stenosis with the extruded component resulting in additional mass effect on the left-sided nerve roots within the thecal sac. The extruded component of the herniation is new from the prior study. 7. L5-S1: Broad bulge, spondylotic ridge, and facet hypertrophy with foraminal stenosis and impingement on the exiting nerve roots. Central herniation and spondylotic ridge. Findings are not significantly changed from the prior study.  she is using Diclofenac cream with improvement of her pain. LESI would help her pain, however she would like to hold off.  5/12/21- Patient complains of pain in low back and radiates down the back of both legs and second toe on each foot numb. Last JOSEFA did not help that nmuch. Will repeat MRI.  12/29/20- patient had 60% relief from TFESI. Will hold off on another injection for now.  11/11/20: follow up today. Pain in the back and posterior right buttock and down the posterior leg to the foot. -n/t, just pain. she typically requires 2 JOSEFA's. Last provided >60% relief. I would recommend repeat LESI for improved pain relief, improved ROM and strength.   9/8/20: Pt had >60 % relief from Lesi from pain and ADLS. Pt c/o pain when she cleans her house. Requesting. Requesting Percocet for pain relief when needed.  [] : no [FreeTextEntry3] : 10/26/99 [FreeTextEntry6] : Numbness  [FreeTextEntry7] : left leg down to the toes [FreeTextEntry9] : ointment

## 2024-03-27 ENCOUNTER — APPOINTMENT (OUTPATIENT)
Dept: PAIN MANAGEMENT | Facility: CLINIC | Age: 74
End: 2024-03-27

## 2024-03-27 NOTE — PHYSICAL EXAM
[Flexion] : flexion [Extension] : extension [] : no erythema [de-identified] : extension 10 degrees [TWNoteComboBox7] : forward flexion 75 degrees

## 2024-03-27 NOTE — HISTORY OF PRESENT ILLNESS
[Lower back] : lower back [Work related] : work related [8] : 8 [6] : 6 [Dull/Aching] : dull/aching [Radiating] : radiating [Sharp] : sharp [Throbbing] : throbbing [Tingling] : tingling [Constant] : constant [Household chores] : household chores [Leisure] : leisure [Sleep] : sleep [Social interactions] : social interactions [Rest] : rest [Meds] : meds [Heat] : heat [Sitting] : sitting [Standing] : standing [Bending forward] : bending forward [Walking] : walking [Exercising] : exercising [Stairs] : stairs [Lying in bed] : lying in bed [Retired] : Work status: retired [FreeTextEntry1] : 3/27/24- fu for   12/27/2023: follow up today for PT.   pt states she is having pain going into the right hip and buttocks into the legs.  Has not had PT in a very long time.  When she had PT years ago she had increased ROM and increased bending of more than 10 degrees.  Was able to walk longer > 10 minutes without pain.  Could do laundry and wash dishes with less pain.  Able to twist side to side.  Right now she can only walk 5 minutes without pain.  She cant stand still or sitting for >10 minutes without pain.  PT would offer an increased quality of life and decrease painj and muscle tightness and weakness  08/21/2023: follow up today.- pt states she is having pain going into the right hip and buttocks into the legs.  Has not had PT in a very long time.  When she had PT years ago she had increased ROM and increased bending of more than 10 degrees.  Was able to walk longer > 10 minutes without pain.  Could do laundry and wash dishes with less pain.  Able to twist side to side.  Right now she can only walk 5 minutes without pain.  She cant stand still or sitting for >10 minutes without pain.  PT would offer an increased quality of life and decrease painj and muscle tightness and weakness.    02/27/23: follow up today. She is using the diclofenac get with relief. They did not authorize PT, however pt is allocated 10 sessions per year per [facility] for maintenance therapy. She was denied this last year, despite her continued pain.   No longer taking gabapentin.   11/14/22- The patient would benefit from physical therapy. Short and Long Term goals would be improvement of pain level, improvement of range of motion, improvement of strength and overall improvement of quality of life. Will renew diclofenac gel  08/15/2022: follow up today.  Would like a refill of gabapentin and diclofenac cream.  Not interested in injections at this time.  12/1/21- Patient complains of pain returning in both legs down front of thighs and numbness into both feet. Will schedule LESI L4-L5.  9/20/21: follow up today after LESI L4/5 on 9/9/21. Pain better in the legs however still with pain in the bilateral hips. she will get this works.   5/26/21: follow up today to review MRI. Pain in the lower back with radiation down the legs to the toes. : 1. Diffuse degenerative disc disease with multilevel Schmorl's nodes and Modic type 1 endplate change at L2-L3 and L5-S1. This is better visualized than on the prior study. 2. T12-L1: Bulge and facet hypertrophy. Broad central and asymmetric to left herniation impressing on the thecal sac with caudal migration posterior to L1. Findings are not significantly changed from the prior study. 3. L1-L2: Broad bulge and facet hypertrophy. Broad central herniation impressing on the thecal sac with mild central stenosis. Findings are not significantly changed from the prior study. 4. L2-L3: Broad bulge, facet hypertrophy, and ligamentum flavum hypertrophy with inferior foraminal stenosis and impingement on the exiting nerve roots. Broad central herniation with mild central stenosis. Findings are not significantly changed from the prior study.5. L3-L4: Broad bulge and facet hypertrophy with inferior foraminal stenosis and impingement on the exiting nerve roots. Central herniation with caudal migration posterior to L4 with mild stenosis. Findings are not significantly changed from the prior study. 6. L4-L5: Broad bulge and facet hypertrophy with foraminal stenosis and impingement on the exiting right L4 nerve root. Broad herniation impressing on the thecal sac with extruded 5 mm asymmetric to left component demonstrating caudal migration posterior to L5. Mild central stenosis with the extruded component resulting in additional mass effect on the left-sided nerve roots within the thecal sac. The extruded component of the herniation is new from the prior study. 7. L5-S1: Broad bulge, spondylotic ridge, and facet hypertrophy with foraminal stenosis and impingement on the exiting nerve roots. Central herniation and spondylotic ridge. Findings are not significantly changed from the prior study.  she is using Diclofenac cream with improvement of her pain. LESI would help her pain, however she would like to hold off.  5/12/21- Patient complains of pain in low back and radiates down the back of both legs and second toe on each foot numb. Last JOSEFA did not help that nmuch. Will repeat MRI.  12/29/20- patient had 60% relief from TFESI. Will hold off on another injection for now.  11/11/20: follow up today. Pain in the back and posterior right buttock and down the posterior leg to the foot. -n/t, just pain. she typically requires 2 JOSEFA's. Last provided >60% relief. I would recommend repeat LESI for improved pain relief, improved ROM and strength.   9/8/20: Pt had >60 % relief from Lesi from pain and ADLS. Pt c/o pain when she cleans her house. Requesting. Requesting Percocet for pain relief when needed.  [] : no [FreeTextEntry3] : 10/26/99 [FreeTextEntry6] : Numbness  [FreeTextEntry7] : left leg down to the toes [FreeTextEntry9] : ointment

## 2024-05-07 ENCOUNTER — APPOINTMENT (OUTPATIENT)
Dept: CARDIOLOGY | Facility: CLINIC | Age: 74
End: 2024-05-07
Payer: MEDICARE

## 2024-05-07 ENCOUNTER — NON-APPOINTMENT (OUTPATIENT)
Age: 74
End: 2024-05-07

## 2024-05-07 VITALS
BODY MASS INDEX: 38.8 KG/M2 | WEIGHT: 219 LBS | HEIGHT: 63 IN | SYSTOLIC BLOOD PRESSURE: 116 MMHG | HEART RATE: 54 BPM | DIASTOLIC BLOOD PRESSURE: 72 MMHG | RESPIRATION RATE: 16 BRPM

## 2024-05-07 DIAGNOSIS — R94.31 ABNORMAL ELECTROCARDIOGRAM [ECG] [EKG]: ICD-10-CM

## 2024-05-07 DIAGNOSIS — R00.2 PALPITATIONS: ICD-10-CM

## 2024-05-07 DIAGNOSIS — R00.1 BRADYCARDIA, UNSPECIFIED: ICD-10-CM

## 2024-05-07 DIAGNOSIS — E66.9 OBESITY, UNSPECIFIED: ICD-10-CM

## 2024-05-07 PROCEDURE — 99214 OFFICE O/P EST MOD 30 MIN: CPT

## 2024-05-07 PROCEDURE — 93000 ELECTROCARDIOGRAM COMPLETE: CPT

## 2024-05-07 PROCEDURE — G2211 COMPLEX E/M VISIT ADD ON: CPT

## 2024-05-07 NOTE — REASON FOR VISIT
[Arrhythmia/ECG Abnorrmalities] : arrhythmia/ECG abnormalities [Structural Heart and Valve Disease] : structural heart and valve disease [FreeTextEntry3] : AIDA Howell [FreeTextEntry1] : The patient is a 73-year-old  woman with a history for mild to moderate insufficiency and heart murmur with prior history of palpitations with marked sinus bradycardia status post bariatric surgery with significant weight loss in the remote past.  She returned to the office last month for general cardiac checkup;  Patient contacted our office recently with complaints of increased dyspnea which seems to have improved now and mostly she notices it when trying to go up the stairs.  There hss been no other PND or orthopnea;  Patient denies chest pain, palpitations, dizziness or syncope;

## 2024-05-07 NOTE — ASSESSMENT
[FreeTextEntry1] : EKG: Demonstrates sinus bradycardia at a rate of 54 with borderline LVH pattern.  No acute changes;  In summary, 73-year-old  woman with a history for mild to moderate insufficiency and heart murmur with prior history of palpitations with marked bradycardia status post bariatric surgery with significant weight loss in the remote past;  .  She now admits to also being more fatigued lately, but no longer experiences occasional palpitations.  Patient still admits to some exertional dyspnea especially when going up the stairs;  she denies associated symptoms such as chest pain, orthopnea, PND, peripheral edema, dizziness or syncope.  She admits to not being very physically active and has gained some weight since last office visit. Reports she has never smoked or drank EtOH.  However, she had lived with smokers for most of her early adult life and not sure whether she may have worked where there was asbestos.    Patient does admit to being told that she snores often while sleeping.  She apparently underwent a Sleep study many years ago that was negative for JIMI.    Admits to having chronic back pain that radiates down both legs with chronic degenerative lumbar disc disease.  Unfortunately, she's been denied PT by her Insurance company despite her Pain Management / Orthopedist Specialist recommending PT.    Nuclear stress test with pharmacologic protocol from March 2022--demonstrated no symptoms of chest pain. No arrhythmias were seen. EKG remained negative for significant ST abnormalities. Myocardial perfusion images showed normal perfusion without any evidence of ischemia-i.e. negative test;  Most recent 2 week Event monitor (11/13 to 11/26/23): Presenting rhythm was sinus with average heart rate 61 bpm (Max 120, Min 34).  There were no significant arrhythmias with no atrial fibrillation, SVT, VT, pauses or AV blocks.  There were occasional PVCs and PACs with overall burden of 2%.  There were 5 patient triggered events for (SOB, lightheadedness, heart racing) all correlating with either sinus rhythm in the 70s -or- sinus bradycardia in the 50s.    Most recent Transthoracic Echocardiogram (11/20/23):  The LV cavity size and wall thickness is normal with overall normal LV systolic and diastolic function; LVEF of 55 to 60%.  There were no regional wall motion abnormalities seen.  The left and right atria were normal.  Trace MR and moderate TR. There was no echocardiographic evidence of pulmonary HTN.     But otherwise appears to be cardiac and hemodynamically stable with normal blood pressure today in the office;  PLAN:  1).  Encouraging patient to get back on track with a sensible nutritional diet this spring-summer and commence walking exercise regimen;  Maintain good p.o. hydration with fluids especially in the warm summer months;  No additional cardiac workup indicated at this time;  Patient to report any untoward chest symptoms, dizziness or lightheadedness; return to our office within 5 to 6 months or as needed;  Continue regular checkups and laboratory blood test with primary care encouraged;

## 2024-05-07 NOTE — PHYSICAL EXAM
[Well Developed] : well developed [No Acute Distress] : no acute distress [Obese] : obese [Normal Conjunctiva] : normal conjunctiva [Normal Venous Pressure] : normal venous pressure [No Carotid Bruit] : no carotid bruit [Normal S1, S2] : normal S1, S2 [No Rub] : no rub [No Gallop] : no gallop [Clear Lung Fields] : clear lung fields [Good Air Entry] : good air entry [No Respiratory Distress] : no respiratory distress  [Soft] : abdomen soft [Non Tender] : non-tender [No Masses/organomegaly] : no masses/organomegaly [Normal Gait] : normal gait [No Edema] : no edema [No Cyanosis] : no cyanosis [No Clubbing] : no clubbing [No Rash] : no rash [No Skin Lesions] : no skin lesions [Moves all extremities] : moves all extremities [No Focal Deficits] : no focal deficits [Normal Speech] : normal speech [Alert and Oriented] : alert and oriented [Normal memory] : normal memory [de-identified] : Regular rhythm, grade 1-2/6 systolic murmur;

## 2024-05-07 NOTE — REVIEW OF SYSTEMS
[Headache] : headache [Dyspnea on exertion] : dyspnea during exertion [Anxiety] : anxiety [Under Stress] : under stress [Negative] : Heme/Lymph
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
## 2024-05-08 ENCOUNTER — APPOINTMENT (OUTPATIENT)
Dept: PAIN MANAGEMENT | Facility: CLINIC | Age: 74
End: 2024-05-08

## 2024-06-13 ENCOUNTER — APPOINTMENT (OUTPATIENT)
Dept: MRI IMAGING | Facility: CLINIC | Age: 74
End: 2024-06-13
Payer: MEDICARE

## 2024-06-13 ENCOUNTER — APPOINTMENT (OUTPATIENT)
Dept: ORTHOPEDIC SURGERY | Facility: CLINIC | Age: 74
End: 2024-06-13
Payer: MEDICARE

## 2024-06-13 VITALS — WEIGHT: 212 LBS | HEIGHT: 63 IN | BODY MASS INDEX: 37.56 KG/M2

## 2024-06-13 DIAGNOSIS — Z78.9 OTHER SPECIFIED HEALTH STATUS: ICD-10-CM

## 2024-06-13 PROCEDURE — 99214 OFFICE O/P EST MOD 30 MIN: CPT

## 2024-06-13 PROCEDURE — 73721 MRI JNT OF LWR EXTRE W/O DYE: CPT | Mod: LT,MH

## 2024-06-13 PROCEDURE — 73610 X-RAY EXAM OF ANKLE: CPT | Mod: LT

## 2024-06-13 NOTE — HISTORY OF PRESENT ILLNESS
[Sharp] : sharp [de-identified] : 06/13/2024: 1 year ankle pain. does not recall any specific injury. seen prev and rec PT but did not go. no prior sig ankle probs. walking in regular shoes w/ brace. denies dm/tob.  [] : Post Surgical Visit: no [FreeTextEntry1] : L foot  [FreeTextEntry3] : 1 year

## 2024-06-13 NOTE — ASSESSMENT
[FreeTextEntry1] : wbat mri to eval extent of tendinopathy rec vascular eval given sig post tib arterial calcificaition -- contact info given f/up after mri and vascular eval

## 2024-06-13 NOTE — IMAGING
[Left] : left ankle [There are no fractures, subluxations or dislocations. No significant abnormalities are seen] : There are no fractures, subluxations or dislocations. No significant abnormalities are seen [FreeTextEntry9] : sig post tib arterial calcification

## 2024-06-13 NOTE — PHYSICAL EXAM
[Left] : left foot and ankle [NL (40)] : plantar flexion 40 degrees [NL 30)] : inversion 30 degrees [NL (20)] : eversion 20 degrees [5___] : eversion 5[unfilled]/5 [1+] : dorsalis pedis pulse: 1+ [] : patient ambulates without assistive device [TWNoteComboBox7] : dorsiflexion 15 degrees

## 2024-06-19 ENCOUNTER — APPOINTMENT (OUTPATIENT)
Dept: ORTHOPEDIC SURGERY | Facility: CLINIC | Age: 74
End: 2024-06-19
Payer: MEDICARE

## 2024-06-19 DIAGNOSIS — M76.822 POSTERIOR TIBIAL TENDINITIS, LEFT LEG: ICD-10-CM

## 2024-06-19 PROCEDURE — 99214 OFFICE O/P EST MOD 30 MIN: CPT

## 2024-06-19 NOTE — HISTORY OF PRESENT ILLNESS
[Sharp] : sharp [de-identified] : 06/13/2024: 1 year ankle pain. does not recall any specific injury. seen prev and rec PT but did not go. no prior sig ankle probs. walking in regular shoes w/ brace. denies dm/tob.   06/19/24: no change. using brace. mri f/up. vascular eval pending [] : Post Surgical Visit: no [FreeTextEntry1] : L foot  [FreeTextEntry3] : 1 year

## 2024-06-19 NOTE — DATA REVIEWED
[MRI] : MRI [Left] : left [Ankle] : ankle [I reviewed the films/CD and additionally noted] : I reviewed the films/CD and additionally noted [FreeTextEntry1] : post tib tendinopathy

## 2024-06-19 NOTE — PHYSICAL EXAM
[Left] : left foot and ankle [NL (40)] : plantar flexion 40 degrees [NL 30)] : inversion 30 degrees [NL (20)] : eversion 20 degrees [5___] : eversion 5[unfilled]/5 [1+] : dorsalis pedis pulse: 1+ [] : no achilles tendon tenderness [TWNoteComboBox7] : dorsiflexion 15 degrees

## 2024-07-19 ENCOUNTER — APPOINTMENT (OUTPATIENT)
Dept: VASCULAR SURGERY | Facility: CLINIC | Age: 74
End: 2024-07-19

## 2024-07-30 ENCOUNTER — APPOINTMENT (OUTPATIENT)
Dept: PAIN MANAGEMENT | Facility: CLINIC | Age: 74
End: 2024-07-30
Payer: OTHER MISCELLANEOUS

## 2024-07-30 VITALS — BODY MASS INDEX: 38.09 KG/M2 | HEIGHT: 63 IN | WEIGHT: 215 LBS

## 2024-07-30 DIAGNOSIS — M54.50 LOW BACK PAIN, UNSPECIFIED: ICD-10-CM

## 2024-07-30 DIAGNOSIS — M47.816 SPONDYLOSIS W/OUT MYELOPATHY OR RADICULOPATHY, LUMBAR REGION: ICD-10-CM

## 2024-07-30 DIAGNOSIS — M48.062 SPINAL STENOSIS, LUMBAR REGION WITH NEUROGENIC CLAUDICATION: ICD-10-CM

## 2024-07-30 DIAGNOSIS — M54.16 RADICULOPATHY, LUMBAR REGION: ICD-10-CM

## 2024-07-30 PROCEDURE — 99205 OFFICE O/P NEW HI 60 MIN: CPT

## 2024-07-30 NOTE — ASSESSMENT
[FreeTextEntry1] : A discussion regarding available pain management treatment options occurred with the patient.  These included interventional, rehabilitative, pharmacological, and alternative modalities. We will proceed with the following:    Interventional treatment options:   - Will consider further pending review of updated MRI imaging - Patient reports failure with previous attempts at lumbar JOSEFA (all via L5-S1 interlaminar approach) - see additional instructions below    Rehabilitative options:   - initiate trial of physical/aqua therapy - Patient reports significant benefit in the past with rehabilitative modalities; she had last trialed physical therapy several years ago and feels that her overall functionality has declined given the extended timeframe since her last attempt - participation in active HEP was discussed and encouraged as tolerated  Medication based treatment options:   - Continue OTC NSAIDs on as-needed basis - see additional instructions below    Complementary treatment options:   - Weight management and lifestyle modifications discussed     Additional treatment recommendations as follows:   -Obtain MRI lumbar spine; evaluate for progression of stenosis -Follow-up for MRI imaging review  We have discussed the risks, benefits, and alternatives for NSAID therapy including but not limited to the risk of bleeding, thrombosis, gastric mucosal irritation/ulceration, allergic reaction and kidney dysfunction.  The patient verbalizes an understanding.  The documentation recorded by the scribe, in my presence, accurately reflects the service I personally performed and the decisions made by me with my edits as appropriate.   I, Ricky Vasquez acting as scribe, attest that this documentation has been prepared under the direction and in the presence of Provider Miguel Angel Galarza DO.
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
## 2024-07-30 NOTE — WORK
[Total (100%)] : total (100%) [I provided the services listed above] :  I provided the services listed above. [FreeTextEntry1] : Retired

## 2024-07-30 NOTE — PHYSICAL EXAM
[de-identified] : Constitutional:   - No acute distress   - Obese  Neurological:   - normal mood and affect   - alert and oriented x 3     Cardiovascular:   - grossly normal   Lumbar Spine Exam:   Inspection: erythema (-) ecchymosis (-) rashes (-) alignment: no scoliosis   Palpation: Midline lumbar tenderness:            (+) midline thoracic tenderness:          (-) Lumbar paraspinal tenderness:  L (+) ; R (+) thoracic paraspinal tenderness: L (-) ; R (-) sciatic nerve tenderness :          L (-) ; R (-) SI joint tenderness:                     L (+) ; R (+) GTB tenderness:                        L (-);  R (-)   ROM: Reduced all planes pain with minimal flexion/extension   Strength:                                    Right       Left    Hip Flexion:                (5/5)       (5/5) Quadriceps:               (5/5)       (5/5) Hamstrings:                (5/5)       (5/5) Ankle Dorsiflexion:     (5/5)       (5/5) EHL:                           (5/5)       (5/5) Ankle Plantarflexion:  (5/5)       (5/5)   Special Tests: SLR:                            R (=) ; L (=) Facet loading:             R (+) ; L (+) DARYL test:                R (N/A) ; L (N/A) Hamstring tightness:   R (+);  L (+)   Neurologic: SILT throughout right lower extremity SILT throughout left lower extremity   Reflexes normal and symmetric bilateral lower extremities   Gait: Mildly antalgic gait ambulates without assistive device

## 2024-07-30 NOTE — REASON FOR VISIT
[Initial Consultation] : an initial pain management consultation [FreeTextEntry2] : Low back/bilateral leg pain

## 2024-08-05 ENCOUNTER — NON-APPOINTMENT (OUTPATIENT)
Age: 74
End: 2024-08-05

## 2024-08-08 ENCOUNTER — EMERGENCY (EMERGENCY)
Facility: HOSPITAL | Age: 74
LOS: 1 days | Discharge: HOME | End: 2024-08-08
Attending: STUDENT IN AN ORGANIZED HEALTH CARE EDUCATION/TRAINING PROGRAM
Payer: MEDICARE

## 2024-08-08 VITALS
DIASTOLIC BLOOD PRESSURE: 81 MMHG | OXYGEN SATURATION: 100 % | SYSTOLIC BLOOD PRESSURE: 130 MMHG | WEIGHT: 218.26 LBS | HEART RATE: 51 BPM | TEMPERATURE: 98 F | HEIGHT: 63 IN | RESPIRATION RATE: 16 BRPM

## 2024-08-08 DIAGNOSIS — Z96.659 PRESENCE OF UNSPECIFIED ARTIFICIAL KNEE JOINT: Chronic | ICD-10-CM

## 2024-08-08 LAB
ALBUMIN SERPL ELPH-MCNC: 4.3 G/DL — SIGNIFICANT CHANGE UP (ref 3.3–5.2)
ALP SERPL-CCNC: 100 U/L — SIGNIFICANT CHANGE UP (ref 40–120)
ALT FLD-CCNC: 20 U/L — SIGNIFICANT CHANGE UP
ANION GAP SERPL CALC-SCNC: 13 MMOL/L — SIGNIFICANT CHANGE UP (ref 5–17)
AST SERPL-CCNC: 25 U/L — SIGNIFICANT CHANGE UP
BASOPHILS # BLD AUTO: 0.07 K/UL — SIGNIFICANT CHANGE UP (ref 0–0.2)
BASOPHILS NFR BLD AUTO: 0.9 % — SIGNIFICANT CHANGE UP (ref 0–2)
BILIRUB SERPL-MCNC: 0.9 MG/DL — SIGNIFICANT CHANGE UP (ref 0.4–2)
BUN SERPL-MCNC: 15 MG/DL — SIGNIFICANT CHANGE UP (ref 8–20)
CALCIUM SERPL-MCNC: 9.5 MG/DL — SIGNIFICANT CHANGE UP (ref 8.4–10.5)
CHLORIDE SERPL-SCNC: 103 MMOL/L — SIGNIFICANT CHANGE UP (ref 96–108)
CO2 SERPL-SCNC: 24 MMOL/L — SIGNIFICANT CHANGE UP (ref 22–29)
CREAT SERPL-MCNC: 0.85 MG/DL — SIGNIFICANT CHANGE UP (ref 0.5–1.3)
EGFR: 73 ML/MIN/1.73M2 — SIGNIFICANT CHANGE UP
EOSINOPHIL # BLD AUTO: 0.2 K/UL — SIGNIFICANT CHANGE UP (ref 0–0.5)
EOSINOPHIL NFR BLD AUTO: 2.6 % — SIGNIFICANT CHANGE UP (ref 0–6)
GLUCOSE SERPL-MCNC: 104 MG/DL — HIGH (ref 70–99)
HCT VFR BLD CALC: 40.6 % — SIGNIFICANT CHANGE UP (ref 34.5–45)
HGB BLD-MCNC: 13.6 G/DL — SIGNIFICANT CHANGE UP (ref 11.5–15.5)
IMM GRANULOCYTES NFR BLD AUTO: 0.3 % — SIGNIFICANT CHANGE UP (ref 0–0.9)
LYMPHOCYTES # BLD AUTO: 2.35 K/UL — SIGNIFICANT CHANGE UP (ref 1–3.3)
LYMPHOCYTES # BLD AUTO: 30.2 % — SIGNIFICANT CHANGE UP (ref 13–44)
MCHC RBC-ENTMCNC: 29 PG — SIGNIFICANT CHANGE UP (ref 27–34)
MCHC RBC-ENTMCNC: 33.5 GM/DL — SIGNIFICANT CHANGE UP (ref 32–36)
MCV RBC AUTO: 86.6 FL — SIGNIFICANT CHANGE UP (ref 80–100)
MONOCYTES # BLD AUTO: 0.53 K/UL — SIGNIFICANT CHANGE UP (ref 0–0.9)
MONOCYTES NFR BLD AUTO: 6.8 % — SIGNIFICANT CHANGE UP (ref 2–14)
NEUTROPHILS # BLD AUTO: 4.61 K/UL — SIGNIFICANT CHANGE UP (ref 1.8–7.4)
NEUTROPHILS NFR BLD AUTO: 59.2 % — SIGNIFICANT CHANGE UP (ref 43–77)
PLATELET # BLD AUTO: 329 K/UL — SIGNIFICANT CHANGE UP (ref 150–400)
POTASSIUM SERPL-MCNC: 4.5 MMOL/L — SIGNIFICANT CHANGE UP (ref 3.5–5.3)
POTASSIUM SERPL-SCNC: 4.5 MMOL/L — SIGNIFICANT CHANGE UP (ref 3.5–5.3)
PROT SERPL-MCNC: 7.6 G/DL — SIGNIFICANT CHANGE UP (ref 6.6–8.7)
RBC # BLD: 4.69 M/UL — SIGNIFICANT CHANGE UP (ref 3.8–5.2)
RBC # FLD: 13.4 % — SIGNIFICANT CHANGE UP (ref 10.3–14.5)
SODIUM SERPL-SCNC: 140 MMOL/L — SIGNIFICANT CHANGE UP (ref 135–145)
WBC # BLD: 7.78 K/UL — SIGNIFICANT CHANGE UP (ref 3.8–10.5)
WBC # FLD AUTO: 7.78 K/UL — SIGNIFICANT CHANGE UP (ref 3.8–10.5)

## 2024-08-08 PROCEDURE — 71260 CT THORAX DX C+: CPT | Mod: MC

## 2024-08-08 PROCEDURE — 73200 CT UPPER EXTREMITY W/O DYE: CPT | Mod: 26,RT,MC

## 2024-08-08 PROCEDURE — 73200 CT UPPER EXTREMITY W/O DYE: CPT | Mod: MC

## 2024-08-08 PROCEDURE — 99285 EMERGENCY DEPT VISIT HI MDM: CPT

## 2024-08-08 PROCEDURE — 99284 EMERGENCY DEPT VISIT MOD MDM: CPT | Mod: 25

## 2024-08-08 PROCEDURE — 71046 X-RAY EXAM CHEST 2 VIEWS: CPT | Mod: 26

## 2024-08-08 PROCEDURE — 73590 X-RAY EXAM OF LOWER LEG: CPT

## 2024-08-08 PROCEDURE — 74177 CT ABD & PELVIS W/CONTRAST: CPT | Mod: 26,MC

## 2024-08-08 PROCEDURE — 73590 X-RAY EXAM OF LOWER LEG: CPT | Mod: 26,RT

## 2024-08-08 PROCEDURE — 85025 COMPLETE CBC W/AUTO DIFF WBC: CPT

## 2024-08-08 PROCEDURE — 73030 X-RAY EXAM OF SHOULDER: CPT

## 2024-08-08 PROCEDURE — 96374 THER/PROPH/DIAG INJ IV PUSH: CPT | Mod: XU

## 2024-08-08 PROCEDURE — 73030 X-RAY EXAM OF SHOULDER: CPT | Mod: 26,RT

## 2024-08-08 PROCEDURE — 71260 CT THORAX DX C+: CPT | Mod: 26,MC

## 2024-08-08 PROCEDURE — 74177 CT ABD & PELVIS W/CONTRAST: CPT | Mod: MC

## 2024-08-08 PROCEDURE — 71046 X-RAY EXAM CHEST 2 VIEWS: CPT

## 2024-08-08 PROCEDURE — 73564 X-RAY EXAM KNEE 4 OR MORE: CPT

## 2024-08-08 PROCEDURE — 73564 X-RAY EXAM KNEE 4 OR MORE: CPT | Mod: 26,RT

## 2024-08-08 PROCEDURE — 80053 COMPREHEN METABOLIC PANEL: CPT

## 2024-08-08 PROCEDURE — 36415 COLL VENOUS BLD VENIPUNCTURE: CPT

## 2024-08-08 RX ORDER — ACETAMINOPHEN 500 MG
650 TABLET ORAL ONCE
Refills: 0 | Status: COMPLETED | OUTPATIENT
Start: 2024-08-08 | End: 2024-08-08

## 2024-08-08 RX ORDER — LIDOCAINE 5% 5 G/100G
1 CREAM TOPICAL ONCE
Refills: 0 | Status: COMPLETED | OUTPATIENT
Start: 2024-08-08 | End: 2024-08-08

## 2024-08-08 RX ORDER — KETOROLAC TROMETHAMINE 10 MG
15 TABLET ORAL ONCE
Refills: 0 | Status: DISCONTINUED | OUTPATIENT
Start: 2024-08-08 | End: 2024-08-08

## 2024-08-08 RX ADMIN — Medication 650 MILLIGRAM(S): at 11:51

## 2024-08-08 RX ADMIN — Medication 15 MILLIGRAM(S): at 17:42

## 2024-08-08 RX ADMIN — Medication 650 MILLIGRAM(S): at 14:35

## 2024-08-08 RX ADMIN — LIDOCAINE 5% 1 PATCH: 5 CREAM TOPICAL at 11:51

## 2024-08-08 NOTE — ED PROVIDER NOTE - OBJECTIVE STATEMENT
75yo F presenting with R shoulder, RUQ pain, low back pain, and R knee pain s/p fall from standing onto R side 2 days ago at [facility], denies hitting head or loss of consciousness. Went to  and sent to ED. Denies a/c, able to ambulate.

## 2024-08-08 NOTE — ED PROVIDER NOTE - PROGRESS NOTE DETAILS
[facility] Received signout on patient upon reevaluation per Dr. Catalan able to range full range of motion of the shoulder.  Patient has chronic shoulder dislocations  Will obtain CT scan for further evaluation unlikely to be dislocated given physical exam
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
## 2024-08-08 NOTE — ED PROVIDER NOTE - PATIENT PORTAL LINK FT
You can access the FollowMyHealth Patient Portal offered by [facility] by registering at the following website: [email or web address]. By joining [employer]’s FollowMyHealth portal, you will also be able to view your health information using other applications (apps) compatible with our system.

## 2024-08-08 NOTE — ED PROVIDER NOTE - NSFOLLOWUPINSTRUCTIONS_ED_ALL_ED_FT
Please return to the ED if your symptoms worsen. Please see your OB/Gyn for the incidental finding on your CT scan of abdomen. Please return to the ED if you have increased pain or sudden vaginal bleeding. Please read attached info regarding shoulder pain

## 2024-08-08 NOTE — ED PROVIDER NOTE - CLINICAL SUMMARY MEDICAL DECISION MAKING FREE TEXT BOX
patient with RUQ/R shoulder/R knee/low back pain s/p fall 2 days ago- xr r/o fracture, ct chest/abd/pelvis, pain control, labs reassess. Marta Catalan DO

## 2024-08-08 NOTE — ED PROVIDER NOTE - PHYSICAL EXAMINATION
Gen: NAD, AOx3  Head: NCAT  HEENT: oral mucosa moist, normal conjunctiva, oropharynx clear without exudate or erythema  Lung: CTAB, no respiratory distress, no wheezing, rales, rhonchi  CV: normal s1/s2, rrr, no murmurs, Normal perfusion, pulses 2+ throughout  Abd: soft, +TTP RUQ/R lateral ribs 8-10  MSK: +TTP over prox R humerus, +TTP over medial aspect R knee, +TTP midline l-spine, No edema, no visible deformities, full range of motion in all 4 extremities  Neuro: CN II-XII grossly intact, No focal neurologic deficits  Skin: No rash/ecchymosis  Psych: normal affect

## 2024-08-09 ENCOUNTER — APPOINTMENT (OUTPATIENT)
Dept: VASCULAR SURGERY | Facility: CLINIC | Age: 74
End: 2024-08-09

## 2024-08-09 PROBLEM — I73.9 PAD (PERIPHERAL ARTERY DISEASE): Status: ACTIVE | Noted: 2024-08-09

## 2024-08-09 PROCEDURE — 93923 UPR/LXTR ART STDY 3+ LVLS: CPT

## 2024-08-09 PROCEDURE — 99204 OFFICE O/P NEW MOD 45 MIN: CPT

## 2024-08-14 ENCOUNTER — APPOINTMENT (OUTPATIENT)
Dept: ORTHOPEDIC SURGERY | Facility: CLINIC | Age: 74
End: 2024-08-14
Payer: MEDICARE

## 2024-08-14 DIAGNOSIS — M76.822 POSTERIOR TIBIAL TENDINITIS, LEFT LEG: ICD-10-CM

## 2024-08-14 DIAGNOSIS — I73.9 PERIPHERAL VASCULAR DISEASE, UNSPECIFIED: ICD-10-CM

## 2024-08-14 PROCEDURE — 99214 OFFICE O/P EST MOD 30 MIN: CPT

## 2024-08-14 NOTE — HISTORY OF PRESENT ILLNESS
[Sharp] : sharp [de-identified] : 06/13/2024: 1 year ankle pain. does not recall any specific injury. seen prev and rec PT but did not go. no prior sig ankle probs. walking in regular shoes w/ brace. denies dm/tob.   06/19/24: no change. using brace. mri f/up. vascular eval pending  08/14/2024:  improving. going to PT. not using brace. had vasc eval -- evidence PAD but no intervention at this stage [] : Post Surgical Visit: no [FreeTextEntry1] : L foot  [FreeTextEntry3] : 1 year

## 2024-08-14 NOTE — ASSESSMENT
[FreeTextEntry1] : wbat advise use of brace/supportive shoe PT nsaIds prn activity as slava no surgical indication at this time f/up 3 months

## 2024-08-15 NOTE — PHYSICAL EXAM
[Normal Rate and Rhythm] : normal rate and rhythm [2+] : left 2+ [No Rash or Lesion] : No rash or lesion [Alert] : alert [Oriented to Person] : oriented to person [Oriented to Place] : oriented to place [Oriented to Time] : oriented to time [Calm] : calm [Ankle Swelling (On Exam)] : not present [Skin Ulcer] : no ulcer [de-identified] : Appears well, in no acute distress. [de-identified] : normocephalic, atraumatic [de-identified] :  supple, no masses. [de-identified] : Moves all extremities

## 2024-08-15 NOTE — PHYSICAL EXAM
[Normal Rate and Rhythm] : normal rate and rhythm [2+] : left 2+ [No Rash or Lesion] : No rash or lesion [Alert] : alert [Oriented to Person] : oriented to person [Oriented to Place] : oriented to place [Oriented to Time] : oriented to time [Calm] : calm [Ankle Swelling (On Exam)] : not present [Skin Ulcer] : no ulcer [de-identified] : Appears well, in no acute distress. [de-identified] : normocephalic, atraumatic [de-identified] :  supple, no masses. [de-identified] : Moves all extremities

## 2024-08-15 NOTE — HISTORY OF PRESENT ILLNESS
[FreeTextEntry1] : 74-year-old female non-smoker presents with referral from orthopedics after left ankle MRI reveals posterior tibial artery calcifications.  Patient reports having over a year of left ankle pain for which she has been doing physical therapy which has significantly improved the pain.  Of note patient reports she fell recently in [employer] and injured the right side of her body including the right shoulder and lower back. Otherwise, she is doing well and reports that physical therapy has helped a lot.  She denies claudication, rest pain, ulcers, lower extremity swelling, chest pain, shortness of breath.

## 2024-08-15 NOTE — HISTORY OF PRESENT ILLNESS
[FreeTextEntry1] : 74-year-old female non-smoker presents with referral from orthopedics after left ankle MRI reveals posterior tibial artery calcifications.  Patient reports having over a year of left ankle pain for which she has been doing physical therapy which has significantly improved the pain.  Of note patient reports she fell recently in [employer] and injured the right side of her body including the right shoulder and lower back. Otherwise, she is doing well and reports that physical therapy has helped a lot.  She denies claudication, rest pain, ulcers, lower extremity swelling, chest pain, shortness of breath.

## 2024-08-15 NOTE — ASSESSMENT
[FreeTextEntry1] : 74-year-old female with PMH hypothyroidism presenting with referral from orthopedic for left posterior tibial artery calcifications seen on imaging.  ABIs and PVRs performed in office today demonstrates right DEVIN of 1.12 the left was noncompressible with diminished waveforms and left TBI of 0.83.  Patient denies claudication, rest pain and is without wounds.  Plan -The above findings were discussed with the patient at length. -Patient advised to follow if she develops claudication, rest pain, new or nonhealing wounds.  Prior to appointment and during encounter with patient extensive medical records were reviewed including but not limited to, Hospital records, out patient records, laboratory data and microbiology data In addition extensive time was also spent in reviewing diagnostic studies.  Total encounter total time 40 mins >50% of time spent counseling/coordinating care

## 2024-08-19 ENCOUNTER — APPOINTMENT (OUTPATIENT)
Dept: ORTHOPEDIC SURGERY | Facility: CLINIC | Age: 74
End: 2024-08-19
Payer: MEDICARE

## 2024-08-19 DIAGNOSIS — S46.011A STRAIN OF MUSCLE(S) AND TENDON(S) OF THE ROTATOR CUFF OF RIGHT SHOULDER, INITIAL ENCOUNTER: ICD-10-CM

## 2024-08-19 PROCEDURE — 73030 X-RAY EXAM OF SHOULDER: CPT | Mod: RT

## 2024-08-19 PROCEDURE — 99204 OFFICE O/P NEW MOD 45 MIN: CPT

## 2024-08-19 PROCEDURE — 73010 X-RAY EXAM OF SHOULDER BLADE: CPT | Mod: RT

## 2024-08-19 RX ORDER — METHYLPREDNISOLONE 4 MG/1
4 TABLET ORAL
Qty: 1 | Refills: 1 | Status: ACTIVE | COMMUNITY
Start: 2024-08-19 | End: 1900-01-01

## 2024-08-19 NOTE — CONSULT LETTER
[Dear  ___] : Dear  [unfilled], [Consult Letter:] : I had the pleasure of evaluating your patient, [unfilled]. [Please see my note below.] : Please see my note below. [Consult Closing:] : Thank you very much for allowing me to participate in the care of this patient.  If you have any questions, please do not hesitate to contact me. [Sincerely,] : Sincerely, [FreeTextEntry3] : Dr. Sundar Schreiber Shoulder Surgery

## 2024-08-19 NOTE — HISTORY OF PRESENT ILLNESS
[de-identified] : 73 yo F returning with right shoulder pain that occurred after falling at [facility] on 08/06/2024. Slipped and fell on water. Went to -advised to go to ER. Was told to f/u w/ortho.  [FreeTextEntry1] : R SHOULDER

## 2024-08-19 NOTE — PHYSICAL EXAM
[Right] : right shoulder [Sitting] : sitting [Moderate] : moderate [4 ___] : forward flexion 4[unfilled]/5 [4___] : external rotation 4[unfilled]/5 [] : no sensory deficits [TWNoteComboBox4] : passive forward flexion 150 degrees [de-identified] : external rotation 60 degrees

## 2024-08-19 NOTE — REASON FOR VISIT
[FreeTextEntry2] : This is a 74 year old RHD female retired [employer] with right shoulder pain after a fall with direct blow on 8/6/24.  She went to  then was told to go to the ED.  There were negative XRs.  She has taken Aleve and Tylenol - she tries to avoid the NSAIDs s/t CKD.  There is pain at night and with reaching.  No numbness.  The pain radiates.  On 1/15/2015, Dr. Schreiber performed a Right Shoulder Arthroscopy, Biceps Tenotomy, Synovectomy, Glenohumeral Debridement, Subacromial Decompression, Medium Rotator Cuff Repair (DR), Distal Clavicle Resection.  She recovered well and had no issues.

## 2024-08-19 NOTE — ASSESSMENT
[FreeTextEntry1] : We reviewed the findings and the history. Questions were answered and concerns addressed. The options were outlined. MDP planned. MRI to evaluate for retear planned. Tx pending results.  Patient was seen by Dr. Sundar Schreiber. Patient was seen by Yazmin NGUYEN under the supervision of Dr. Sundar Schreiber. Progress note was completed by Yazmin NGUYEN.

## 2024-08-19 NOTE — IMAGING
[Right] : right shoulder [FreeTextEntry1] : The GH is OK.  There is a DCR.  There is a small lateral calcium deposit.  There is no obvious fracture. [FreeTextEntry5] : There is a Type I acromion.

## 2024-08-20 ENCOUNTER — APPOINTMENT (OUTPATIENT)
Dept: MRI IMAGING | Facility: CLINIC | Age: 74
End: 2024-08-20
Payer: MEDICARE

## 2024-08-20 PROCEDURE — 73221 MRI JOINT UPR EXTREM W/O DYE: CPT | Mod: RT,MH

## 2024-08-22 ENCOUNTER — APPOINTMENT (OUTPATIENT)
Dept: ORTHOPEDIC SURGERY | Facility: CLINIC | Age: 74
End: 2024-08-22

## 2024-08-29 ENCOUNTER — APPOINTMENT (OUTPATIENT)
Dept: PAIN MANAGEMENT | Facility: CLINIC | Age: 74
End: 2024-08-29

## 2024-08-29 VITALS — BODY MASS INDEX: 38.98 KG/M2 | WEIGHT: 220 LBS | HEIGHT: 63 IN

## 2024-08-29 DIAGNOSIS — M47.816 SPONDYLOSIS W/OUT MYELOPATHY OR RADICULOPATHY, LUMBAR REGION: ICD-10-CM

## 2024-08-29 DIAGNOSIS — M48.062 SPINAL STENOSIS, LUMBAR REGION WITH NEUROGENIC CLAUDICATION: ICD-10-CM

## 2024-08-29 DIAGNOSIS — M54.16 RADICULOPATHY, LUMBAR REGION: ICD-10-CM

## 2024-08-29 PROCEDURE — 99203 OFFICE O/P NEW LOW 30 MIN: CPT

## 2024-08-29 NOTE — HISTORY OF PRESENT ILLNESS
[Work related] : work related [6] : 6 [Sharp] : sharp [Throbbing] : throbbing [Tingling] : tingling [FreeTextEntry6] : Numbness  [Lower back] : lower back [Left Leg] : left leg [Right Leg] : right leg [10] : 10 [8] : 8 [Burning] : burning [Dull/Aching] : dull/aching [Radiating] : radiating [Constant] : constant [Household chores] : household chores [Leisure] : leisure [Sleep] : sleep [Social interactions] : social interactions [Rest] : rest [Meds] : meds [Heat] : heat [Sitting] : sitting [Standing] : standing [Walking] : walking [Bending forward] : bending forward [Exercising] : exercising [Stairs] : stairs [Lying in bed] : lying in bed [Retired] : Work status: retired [FreeTextEntry5] : Slipped and fell at [employer] on a wet floor [] : no [FreeTextEntry1] : rt side  [FreeTextEntry3] : 10/26/99 [FreeTextEntry7] : rt groin, rt buttocks, rt side, b/l leg [FreeTextEntry9] : ointment

## 2024-08-29 NOTE — PHYSICAL EXAM
May discuss health hx and instructions with s/o Annette and mom Danisha.       English [de-identified] : Constitutional:   - No acute distress   - Obese  Neurological:   - normal mood and affect   - alert and oriented x 3     Cardiovascular:   - grossly normal   Lumbar Spine Exam:   Inspection: erythema (-) ecchymosis (-) rashes (-) alignment: no scoliosis   Palpation: Midline lumbar tenderness:            (+) midline thoracic tenderness:          (-) Lumbar paraspinal tenderness:  L (+) ; R (+) thoracic paraspinal tenderness: L (-) ; R (-) sciatic nerve tenderness :          L (-) ; R (-) SI joint tenderness:                     L (+) ; R (+) GTB tenderness:                        L (-);  R (-)   ROM: Reduced all planes pain with minimal flexion/extension   Strength:                                    Right       Left    Hip Flexion:                (5/5)       (5/5) Quadriceps:               (5/5)       (5/5) Hamstrings:                (5/5)       (5/5) Ankle Dorsiflexion:     (5/5)       (5/5) EHL:                           (5/5)       (5/5) Ankle Plantarflexion:  (5/5)       (5/5)   Special Tests: SLR:                            R (=) ; L (=) Facet loading:             R (+) ; L (+) DARYL test:                R (N/A) ; L (N/A) Hamstring tightness:   R (+);  L (+)   Neurologic: SILT throughout right lower extremity SILT throughout left lower extremity   Reflexes normal and symmetric bilateral lower extremities   Gait: Mildly antalgic gait ambulates without assistive device

## 2024-08-29 NOTE — PHYSICAL EXAM
[de-identified] : Constitutional:   - No acute distress   - Obese  Neurological:   - normal mood and affect   - alert and oriented x 3     Cardiovascular:   - grossly normal   Lumbar Spine Exam:   Inspection: erythema (-) ecchymosis (-) rashes (-) alignment: no scoliosis   Palpation: Midline lumbar tenderness:            (+) midline thoracic tenderness:          (-) Lumbar paraspinal tenderness:  L (+) ; R (+) thoracic paraspinal tenderness: L (-) ; R (-) sciatic nerve tenderness :          L (-) ; R (-) SI joint tenderness:                     L (+) ; R (+) GTB tenderness:                        L (-);  R (-)   ROM: Reduced all planes pain with minimal flexion/extension   Strength:                                    Right       Left    Hip Flexion:                (5/5)       (5/5) Quadriceps:               (5/5)       (5/5) Hamstrings:                (5/5)       (5/5) Ankle Dorsiflexion:     (5/5)       (5/5) EHL:                           (5/5)       (5/5) Ankle Plantarflexion:  (5/5)       (5/5)   Special Tests: SLR:                            R (=) ; L (=) Facet loading:             R (+) ; L (+) DARYL test:                R (N/A) ; L (N/A) Hamstring tightness:   R (+);  L (+)   Neurologic: SILT throughout right lower extremity SILT throughout left lower extremity   Reflexes normal and symmetric bilateral lower extremities   Gait: Mildly antalgic gait ambulates without assistive device

## 2024-08-29 NOTE — PHYSICAL EXAM
[de-identified] : Constitutional:   - No acute distress   - Obese  Neurological:   - normal mood and affect   - alert and oriented x 3     Cardiovascular:   - grossly normal   Lumbar Spine Exam:   Inspection: erythema (-) ecchymosis (-) rashes (-) alignment: no scoliosis   Palpation: Midline lumbar tenderness:            (+) midline thoracic tenderness:          (-) Lumbar paraspinal tenderness:  L (+) ; R (+) thoracic paraspinal tenderness: L (-) ; R (-) sciatic nerve tenderness :          L (-) ; R (-) SI joint tenderness:                     L (+) ; R (+) GTB tenderness:                        L (-);  R (-)   ROM: Reduced all planes pain with minimal flexion/extension   Strength:                                    Right       Left    Hip Flexion:                (5/5)       (5/5) Quadriceps:               (5/5)       (5/5) Hamstrings:                (5/5)       (5/5) Ankle Dorsiflexion:     (5/5)       (5/5) EHL:                           (5/5)       (5/5) Ankle Plantarflexion:  (5/5)       (5/5)   Special Tests: SLR:                            R (=) ; L (=) Facet loading:             R (+) ; L (+) DARYL test:                R (N/A) ; L (N/A) Hamstring tightness:   R (+);  L (+)   Neurologic: SILT throughout right lower extremity SILT throughout left lower extremity   Reflexes normal and symmetric bilateral lower extremities   Gait: Mildly antalgic gait ambulates without assistive device

## 2024-08-29 NOTE — PHYSICAL EXAM
[de-identified] : Constitutional:   - No acute distress   - Obese  Neurological:   - normal mood and affect   - alert and oriented x 3     Cardiovascular:   - grossly normal   Lumbar Spine Exam:   Inspection: erythema (-) ecchymosis (-) rashes (-) alignment: no scoliosis   Palpation: Midline lumbar tenderness:            (+) midline thoracic tenderness:          (-) Lumbar paraspinal tenderness:  L (+) ; R (+) thoracic paraspinal tenderness: L (-) ; R (-) sciatic nerve tenderness :          L (-) ; R (-) SI joint tenderness:                     L (+) ; R (+) GTB tenderness:                        L (-);  R (-)   ROM: Reduced all planes pain with minimal flexion/extension   Strength:                                    Right       Left    Hip Flexion:                (5/5)       (5/5) Quadriceps:               (5/5)       (5/5) Hamstrings:                (5/5)       (5/5) Ankle Dorsiflexion:     (5/5)       (5/5) EHL:                           (5/5)       (5/5) Ankle Plantarflexion:  (5/5)       (5/5)   Special Tests: SLR:                            R (=) ; L (=) Facet loading:             R (+) ; L (+) DARYL test:                R (N/A) ; L (N/A) Hamstring tightness:   R (+);  L (+)   Neurologic: SILT throughout right lower extremity SILT throughout left lower extremity   Reflexes normal and symmetric bilateral lower extremities   Gait: Mildly antalgic gait ambulates without assistive device

## 2024-08-29 NOTE — REASON FOR VISIT
[Follow-Up Visit] : a follow-up pain management visit [Initial Consultation] : an initial pain management consultation [FreeTextEntry2] : Low back/bilateral leg pain

## 2024-09-06 ENCOUNTER — APPOINTMENT (OUTPATIENT)
Dept: ORTHOPEDIC SURGERY | Facility: CLINIC | Age: 74
End: 2024-09-06
Payer: MEDICARE

## 2024-09-06 DIAGNOSIS — S46.811D STRAIN OF OTHER MUSCLES, FASCIA AND TENDONS AT SHOULDER AND UPPER ARM LEVEL, RIGHT ARM, SUBSEQUENT ENCOUNTER: ICD-10-CM

## 2024-09-06 DIAGNOSIS — M24.011 LOOSE BODY IN RIGHT SHOULDER: ICD-10-CM

## 2024-09-06 PROCEDURE — 99214 OFFICE O/P EST MOD 30 MIN: CPT

## 2024-09-07 ENCOUNTER — APPOINTMENT (OUTPATIENT)
Dept: MRI IMAGING | Facility: CLINIC | Age: 74
End: 2024-09-07

## 2024-09-07 PROCEDURE — 72148 MRI LUMBAR SPINE W/O DYE: CPT | Mod: MH

## 2024-09-09 NOTE — ASSESSMENT
[FreeTextEntry1] : . MRI findings were reviewed and discussed.  We discussed treatment options, both non-operative and operative.  I do think she is a candidate for surgery.  Pain relief is a goal as well as improving function and motion.  I reviewed surgical techniques pictorially in the books that I co-edited.  Interscalene anesthesia, general anesthesia and postoperative pain management were discussed.  The importance of physical therapy postoperatively, the gradual recovery and the rehabilitation program with initial driving restrictions were noted.  The use of a Cryo-Cuff by [employer] and a sling for functional recovery was reviewed.  She understands there are no guarantees.  The benefits of decreased pain, increased function and restoring anatomy were outlined.  The risks were reviewed including, but not limited to, infection, failure, bleeding, stiffness, pain, clotting, fracture, re-tear, hardware failure, deformity, functional limitation, scarring, neurovascular compromise, and narcotic use issues.  Under certain circumstances we discussed, further surgery may be indicated.  She understands that 100% recovery is not expected or guaranteed, and the desired level of function may not be achievable.  The complicated nature of her condition, including the tear pattern, was noted.  We discussed the potential for a prolonged recovery course and the potential for this to affect her activities, which could include a work regimen.  Her questions were answered.  Other opinions can be pursued, as we discussed.  She does wish to proceed with surgery.  This would include a right shoulder arthroscopy, debridement, loose body removal, subscapularis repair, possible revision decompression, possible distal clavicle resection.  Medical clearance is planned.  We will schedule this at the earliest mutual convenient time.  Patient was seen by Dr. Sundar Schreiber. Patient was seen by Yazmin NGUYEN under the supervision of Dr. Sundar Schreiber. Progress note was completed by Yazmin NGUYEN.

## 2024-09-09 NOTE — REASON FOR VISIT
[FreeTextEntry2] : This is a 74 year old RHD female retired [employer] with right shoulder pain after a fall with direct blow on 8/6/24.  She went to  then was told to go to the ED.  There were negative XRs.  She has taken Aleve and Tylenol - she tries to avoid the NSAIDs s/t CKD.  There is pain at night and with reaching.  No numbness.  The pain radiates.  On 1/15/2015, Dr. Schreiber performed a Right Shoulder Arthroscopy, Biceps Tenotomy, Synovectomy, Glenohumeral Debridement, Subacromial Decompression, Medium Rotator Cuff Repair (), Distal Clavicle Resection.  Cayenne anchor was used.  She recovered well and had no issues.

## 2024-09-09 NOTE — PHYSICAL EXAM
[Right] : right shoulder [Sitting] : sitting [Moderate] : moderate [4 ___] : forward flexion 4[unfilled]/5 [4___] : external rotation 4[unfilled]/5 [] : no swelling [TWNoteComboBox4] : passive forward flexion 150 degrees [de-identified] : external rotation 60 degrees

## 2024-09-09 NOTE — DATA REVIEWED
[FreeTextEntry1] : [facility] MRI RT SH 08/20/2024: - There is a loose anchor in the SA space, likely the lateral anchor. - The medial anchor is ok. - The RCR has healed. - The muscle is decent.  There is a partial subscapularis tear. - There is labral tearing.   - The AC has minimal changes.   X-rays of the right shoulder is as follows: Shoulder Comments: The GH is OK. There is a DCR. There is a small lateral calcium deposit. There is no obvious fracture. Scapula Comments: There is a Type I acromion.

## 2024-09-09 NOTE — PHYSICAL EXAM
[Right] : right shoulder [Sitting] : sitting [Moderate] : moderate [4 ___] : forward flexion 4[unfilled]/5 [4___] : external rotation 4[unfilled]/5 [] : no sensory deficits [TWNoteComboBox4] : passive forward flexion 150 degrees [de-identified] : external rotation 60 degrees

## 2024-10-21 DIAGNOSIS — M24.011 LOOSE BODY IN RIGHT SHOULDER: ICD-10-CM

## 2024-10-23 ENCOUNTER — APPOINTMENT (OUTPATIENT)
Dept: ORTHOPEDIC SURGERY | Facility: CLINIC | Age: 74
End: 2024-10-23

## 2024-10-24 ENCOUNTER — APPOINTMENT (OUTPATIENT)
Dept: CARDIOLOGY | Facility: CLINIC | Age: 74
End: 2024-10-24
Payer: MEDICARE

## 2024-10-24 ENCOUNTER — NON-APPOINTMENT (OUTPATIENT)
Age: 74
End: 2024-10-24

## 2024-10-24 VITALS
RESPIRATION RATE: 16 BRPM | SYSTOLIC BLOOD PRESSURE: 142 MMHG | HEIGHT: 63 IN | BODY MASS INDEX: 38.62 KG/M2 | DIASTOLIC BLOOD PRESSURE: 76 MMHG | HEART RATE: 51 BPM | WEIGHT: 218 LBS

## 2024-10-24 DIAGNOSIS — R00.1 BRADYCARDIA, UNSPECIFIED: ICD-10-CM

## 2024-10-24 DIAGNOSIS — R94.31 ABNORMAL ELECTROCARDIOGRAM [ECG] [EKG]: ICD-10-CM

## 2024-10-24 DIAGNOSIS — I34.0 NONRHEUMATIC MITRAL (VALVE) INSUFFICIENCY: ICD-10-CM

## 2024-10-24 DIAGNOSIS — E66.9 OBESITY, UNSPECIFIED: ICD-10-CM

## 2024-10-24 DIAGNOSIS — S46.811D STRAIN OF OTHER MUSCLES, FASCIA AND TENDONS AT SHOULDER AND UPPER ARM LEVEL, RIGHT ARM, SUBSEQUENT ENCOUNTER: ICD-10-CM

## 2024-10-24 DIAGNOSIS — Z01.810 ENCOUNTER FOR PREPROCEDURAL CARDIOVASCULAR EXAMINATION: ICD-10-CM

## 2024-10-24 PROCEDURE — 99214 OFFICE O/P EST MOD 30 MIN: CPT

## 2024-10-24 PROCEDURE — 93000 ELECTROCARDIOGRAM COMPLETE: CPT

## 2024-10-24 RX ORDER — GABAPENTIN 300 MG/1
300 CAPSULE ORAL 3 TIMES DAILY
Qty: 15 | Refills: 0 | Status: DISCONTINUED | COMMUNITY
Start: 2024-10-21 | End: 2024-10-24

## 2024-10-24 RX ORDER — HYDROCODONE BITARTRATE AND ACETAMINOPHEN 7.5; 325 MG/1; MG/1
7.5-325 TABLET ORAL
Qty: 42 | Refills: 0 | Status: DISCONTINUED | COMMUNITY
Start: 2024-10-21 | End: 2024-10-24

## 2024-10-24 RX ORDER — KETOROLAC TROMETHAMINE 10 MG/1
10 TABLET, FILM COATED ORAL EVERY 6 HOURS
Qty: 20 | Refills: 0 | Status: DISCONTINUED | COMMUNITY
Start: 2024-10-21 | End: 2024-10-24

## 2024-10-31 ENCOUNTER — APPOINTMENT (OUTPATIENT)
Age: 74
End: 2024-10-31

## 2024-10-31 PROCEDURE — 29824 SHO ARTHRS SRG DSTL CLAVICLC: CPT | Mod: AS,RT

## 2024-10-31 PROCEDURE — 29827 SHO ARTHRS SRG RT8TR CUF RPR: CPT | Mod: AS,RT

## 2024-10-31 PROCEDURE — 29823 SHO ARTHRS SRG XTNSV DBRDMT: CPT | Mod: AS,59,RT

## 2024-10-31 PROCEDURE — 29819 SHO ARTHRS SRG RMVL LOOSE/FB: CPT | Mod: RT

## 2024-10-31 PROCEDURE — 29827 SHO ARTHRS SRG RT8TR CUF RPR: CPT | Mod: RT

## 2024-10-31 PROCEDURE — 29819 SHO ARTHRS SRG RMVL LOOSE/FB: CPT | Mod: AS,RT

## 2024-10-31 PROCEDURE — 29823 SHO ARTHRS SRG XTNSV DBRDMT: CPT | Mod: 59,RT

## 2024-10-31 PROCEDURE — 29824 SHO ARTHRS SRG DSTL CLAVICLC: CPT | Mod: RT

## 2024-11-04 ENCOUNTER — APPOINTMENT (OUTPATIENT)
Dept: PAIN MANAGEMENT | Facility: CLINIC | Age: 74
End: 2024-11-04
Payer: MEDICARE

## 2024-11-04 VITALS — BODY MASS INDEX: 39.69 KG/M2 | WEIGHT: 224 LBS | HEIGHT: 63 IN

## 2024-11-04 DIAGNOSIS — M54.16 RADICULOPATHY, LUMBAR REGION: ICD-10-CM

## 2024-11-04 DIAGNOSIS — G89.29 PAIN IN RIGHT HIP: ICD-10-CM

## 2024-11-04 DIAGNOSIS — M47.816 SPONDYLOSIS W/OUT MYELOPATHY OR RADICULOPATHY, LUMBAR REGION: ICD-10-CM

## 2024-11-04 DIAGNOSIS — M25.551 PAIN IN RIGHT HIP: ICD-10-CM

## 2024-11-04 DIAGNOSIS — M48.062 SPINAL STENOSIS, LUMBAR REGION WITH NEUROGENIC CLAUDICATION: ICD-10-CM

## 2024-11-04 PROCEDURE — 99213 OFFICE O/P EST LOW 20 MIN: CPT

## 2024-11-07 ENCOUNTER — NON-APPOINTMENT (OUTPATIENT)
Age: 74
End: 2024-11-07

## 2024-11-07 ENCOUNTER — APPOINTMENT (OUTPATIENT)
Dept: CARDIOLOGY | Facility: CLINIC | Age: 74
End: 2024-11-07
Payer: MEDICARE

## 2024-11-07 VITALS
SYSTOLIC BLOOD PRESSURE: 140 MMHG | RESPIRATION RATE: 16 BRPM | HEIGHT: 63 IN | BODY MASS INDEX: 39.16 KG/M2 | DIASTOLIC BLOOD PRESSURE: 72 MMHG | WEIGHT: 221 LBS | HEART RATE: 50 BPM

## 2024-11-07 DIAGNOSIS — R00.2 PALPITATIONS: ICD-10-CM

## 2024-11-07 DIAGNOSIS — R94.31 ABNORMAL ELECTROCARDIOGRAM [ECG] [EKG]: ICD-10-CM

## 2024-11-07 DIAGNOSIS — R00.1 BRADYCARDIA, UNSPECIFIED: ICD-10-CM

## 2024-11-07 DIAGNOSIS — I34.0 NONRHEUMATIC MITRAL (VALVE) INSUFFICIENCY: ICD-10-CM

## 2024-11-07 DIAGNOSIS — I73.9 PERIPHERAL VASCULAR DISEASE, UNSPECIFIED: ICD-10-CM

## 2024-11-07 PROCEDURE — 93000 ELECTROCARDIOGRAM COMPLETE: CPT

## 2024-11-07 PROCEDURE — G2211 COMPLEX E/M VISIT ADD ON: CPT

## 2024-11-07 PROCEDURE — 99214 OFFICE O/P EST MOD 30 MIN: CPT

## 2024-11-08 ENCOUNTER — APPOINTMENT (OUTPATIENT)
Dept: ORTHOPEDIC SURGERY | Facility: CLINIC | Age: 74
End: 2024-11-08
Payer: MEDICARE

## 2024-11-08 VITALS — HEIGHT: 63 IN | BODY MASS INDEX: 38.98 KG/M2 | WEIGHT: 220 LBS

## 2024-11-08 DIAGNOSIS — M24.011 LOOSE BODY IN RIGHT SHOULDER: ICD-10-CM

## 2024-11-08 DIAGNOSIS — Z86.39 PERSONAL HISTORY OF OTHER ENDOCRINE, NUTRITIONAL AND METABOLIC DISEASE: ICD-10-CM

## 2024-11-08 DIAGNOSIS — S46.811D STRAIN OF OTHER MUSCLES, FASCIA AND TENDONS AT SHOULDER AND UPPER ARM LEVEL, RIGHT ARM, SUBSEQUENT ENCOUNTER: ICD-10-CM

## 2024-11-08 PROCEDURE — 99024 POSTOP FOLLOW-UP VISIT: CPT

## 2024-11-08 PROCEDURE — 73030 X-RAY EXAM OF SHOULDER: CPT | Mod: RT

## 2024-11-14 ENCOUNTER — APPOINTMENT (OUTPATIENT)
Dept: ORTHOPEDIC SURGERY | Facility: CLINIC | Age: 74
End: 2024-11-14
Payer: MEDICARE

## 2024-11-14 DIAGNOSIS — M16.11 UNILATERAL PRIMARY OSTEOARTHRITIS, RIGHT HIP: ICD-10-CM

## 2024-11-14 PROCEDURE — 99214 OFFICE O/P EST MOD 30 MIN: CPT

## 2024-11-14 PROCEDURE — 99204 OFFICE O/P NEW MOD 45 MIN: CPT

## 2024-11-14 PROCEDURE — 73503 X-RAY EXAM HIP UNI 4/> VIEWS: CPT | Mod: RT

## 2024-12-20 ENCOUNTER — APPOINTMENT (OUTPATIENT)
Dept: ORTHOPEDIC SURGERY | Facility: CLINIC | Age: 74
End: 2024-12-20
Payer: MEDICARE

## 2024-12-20 VITALS — HEIGHT: 63 IN | WEIGHT: 220 LBS | BODY MASS INDEX: 38.98 KG/M2

## 2024-12-20 DIAGNOSIS — S46.811D STRAIN OF OTHER MUSCLES, FASCIA AND TENDONS AT SHOULDER AND UPPER ARM LEVEL, RIGHT ARM, SUBSEQUENT ENCOUNTER: ICD-10-CM

## 2024-12-20 DIAGNOSIS — M24.011 LOOSE BODY IN RIGHT SHOULDER: ICD-10-CM

## 2024-12-20 PROCEDURE — 99024 POSTOP FOLLOW-UP VISIT: CPT

## 2024-12-20 RX ORDER — HYDROCODONE BITARTRATE AND ACETAMINOPHEN 7.5; 325 MG/1; MG/1
7.5-325 TABLET ORAL
Qty: 30 | Refills: 0 | Status: ACTIVE | COMMUNITY
Start: 2024-12-20 | End: 1900-01-01

## 2025-01-31 ENCOUNTER — APPOINTMENT (OUTPATIENT)
Dept: ORTHOPEDIC SURGERY | Facility: CLINIC | Age: 75
End: 2025-01-31
Payer: MEDICARE

## 2025-01-31 VITALS — HEIGHT: 63 IN | BODY MASS INDEX: 37.21 KG/M2 | WEIGHT: 210 LBS

## 2025-01-31 DIAGNOSIS — M25.511 PAIN IN RIGHT SHOULDER: ICD-10-CM

## 2025-01-31 DIAGNOSIS — S46.811D STRAIN OF OTHER MUSCLES, FASCIA AND TENDONS AT SHOULDER AND UPPER ARM LEVEL, RIGHT ARM, SUBSEQUENT ENCOUNTER: ICD-10-CM

## 2025-01-31 DIAGNOSIS — S46.011A STRAIN OF MUSCLE(S) AND TENDON(S) OF THE ROTATOR CUFF OF RIGHT SHOULDER, INITIAL ENCOUNTER: ICD-10-CM

## 2025-01-31 DIAGNOSIS — M24.011 LOOSE BODY IN RIGHT SHOULDER: ICD-10-CM

## 2025-01-31 PROCEDURE — 99213 OFFICE O/P EST LOW 20 MIN: CPT

## 2025-03-14 ENCOUNTER — APPOINTMENT (OUTPATIENT)
Dept: ORTHOPEDIC SURGERY | Facility: CLINIC | Age: 75
End: 2025-03-14

## 2025-03-14 VITALS — WEIGHT: 210 LBS | HEIGHT: 63 IN | BODY MASS INDEX: 37.21 KG/M2

## 2025-03-14 DIAGNOSIS — M25.511 PAIN IN RIGHT SHOULDER: ICD-10-CM

## 2025-03-14 DIAGNOSIS — S46.011A STRAIN OF MUSCLE(S) AND TENDON(S) OF THE ROTATOR CUFF OF RIGHT SHOULDER, INITIAL ENCOUNTER: ICD-10-CM

## 2025-03-14 DIAGNOSIS — M24.011 LOOSE BODY IN RIGHT SHOULDER: ICD-10-CM

## 2025-03-14 DIAGNOSIS — S46.811D STRAIN OF OTHER MUSCLES, FASCIA AND TENDONS AT SHOULDER AND UPPER ARM LEVEL, RIGHT ARM, SUBSEQUENT ENCOUNTER: ICD-10-CM

## 2025-03-14 PROCEDURE — 99213 OFFICE O/P EST LOW 20 MIN: CPT | Mod: 25

## 2025-03-14 PROCEDURE — 20611 DRAIN/INJ JOINT/BURSA W/US: CPT | Mod: RT

## 2025-05-07 ENCOUNTER — NON-APPOINTMENT (OUTPATIENT)
Age: 75
End: 2025-05-07

## 2025-05-08 ENCOUNTER — APPOINTMENT (OUTPATIENT)
Dept: CARDIOLOGY | Facility: CLINIC | Age: 75
End: 2025-05-08

## 2025-05-08 ENCOUNTER — NON-APPOINTMENT (OUTPATIENT)
Age: 75
End: 2025-05-08

## 2025-05-08 VITALS
BODY MASS INDEX: 38.8 KG/M2 | RESPIRATION RATE: 16 BRPM | HEIGHT: 63 IN | DIASTOLIC BLOOD PRESSURE: 70 MMHG | HEART RATE: 51 BPM | WEIGHT: 219 LBS | SYSTOLIC BLOOD PRESSURE: 146 MMHG

## 2025-05-08 DIAGNOSIS — R94.31 ABNORMAL ELECTROCARDIOGRAM [ECG] [EKG]: ICD-10-CM

## 2025-05-08 DIAGNOSIS — I34.0 NONRHEUMATIC MITRAL (VALVE) INSUFFICIENCY: ICD-10-CM

## 2025-05-08 DIAGNOSIS — R00.2 PALPITATIONS: ICD-10-CM

## 2025-05-08 DIAGNOSIS — I73.9 PERIPHERAL VASCULAR DISEASE, UNSPECIFIED: ICD-10-CM

## 2025-05-08 PROCEDURE — 99214 OFFICE O/P EST MOD 30 MIN: CPT | Mod: 25

## 2025-05-08 PROCEDURE — 93000 ELECTROCARDIOGRAM COMPLETE: CPT

## 2025-05-08 RX ORDER — RAMIPRIL 5 MG/1
5 CAPSULE ORAL DAILY
Qty: 90 | Refills: 3 | Status: ACTIVE | COMMUNITY

## 2025-05-09 ENCOUNTER — APPOINTMENT (OUTPATIENT)
Dept: ORTHOPEDIC SURGERY | Facility: CLINIC | Age: 75
End: 2025-05-09
Payer: MEDICARE

## 2025-05-09 DIAGNOSIS — M19.011 PRIMARY OSTEOARTHRITIS, RIGHT SHOULDER: ICD-10-CM

## 2025-05-09 DIAGNOSIS — S46.011A STRAIN OF MUSCLE(S) AND TENDON(S) OF THE ROTATOR CUFF OF RIGHT SHOULDER, INITIAL ENCOUNTER: ICD-10-CM

## 2025-05-09 DIAGNOSIS — S46.811D STRAIN OF OTHER MUSCLES, FASCIA AND TENDONS AT SHOULDER AND UPPER ARM LEVEL, RIGHT ARM, SUBSEQUENT ENCOUNTER: ICD-10-CM

## 2025-05-09 PROCEDURE — 99213 OFFICE O/P EST LOW 20 MIN: CPT

## 2025-08-05 ENCOUNTER — NON-APPOINTMENT (OUTPATIENT)
Age: 75
End: 2025-08-05